# Patient Record
Sex: MALE | Race: WHITE | NOT HISPANIC OR LATINO | Employment: OTHER | URBAN - NONMETROPOLITAN AREA
[De-identification: names, ages, dates, MRNs, and addresses within clinical notes are randomized per-mention and may not be internally consistent; named-entity substitution may affect disease eponyms.]

---

## 2020-10-14 ENCOUNTER — OFFICE VISIT CONVERTED (OUTPATIENT)
Dept: FAMILY MEDICINE CLINIC | Age: 60
End: 2020-10-14
Attending: FAMILY MEDICINE

## 2020-10-19 ENCOUNTER — HOSPITAL ENCOUNTER (OUTPATIENT)
Dept: OTHER | Facility: HOSPITAL | Age: 60
Discharge: HOME | End: 2020-10-19
Attending: FAMILY MEDICINE

## 2020-10-19 LAB
ALBUMIN SERPL-MCNC: 4.3 G/DL (ref 3.5–5)
ALBUMIN/GLOB SERPL: 1.6 {RATIO} (ref 1.4–2.6)
ALP SERPL-CCNC: 73 U/L (ref 56–155)
ALT SERPL-CCNC: 25 U/L (ref 10–40)
ANION GAP SERPL CALC-SCNC: 20 MMOL/L (ref 8–19)
AST SERPL-CCNC: 21 U/L (ref 15–50)
BASOPHILS # BLD AUTO: 0.07 10*3/UL (ref 0–0.2)
BASOPHILS NFR BLD AUTO: 1.2 % (ref 0–3)
BILIRUB SERPL-MCNC: 0.46 MG/DL (ref 0.2–1.3)
BUN SERPL-MCNC: 15 MG/DL (ref 5–25)
BUN/CREAT SERPL: 16 {RATIO} (ref 6–20)
CALCIUM SERPL-MCNC: 9.4 MG/DL (ref 8.7–10.4)
CHLORIDE SERPL-SCNC: 103 MMOL/L (ref 99–111)
CHOLEST SERPL-MCNC: 192 MG/DL (ref 107–200)
CHOLEST/HDLC SERPL: 4.9 {RATIO} (ref 3–6)
CONV ABS IMM GRAN: 0.04 10*3/UL (ref 0–0.2)
CONV CO2: 22 MMOL/L (ref 22–32)
CONV IMMATURE GRAN: 0.7 % (ref 0–1.8)
CONV TOTAL PROTEIN: 7 G/DL (ref 6.3–8.2)
CREAT UR-MCNC: 0.93 MG/DL (ref 0.7–1.2)
DEPRECATED RDW RBC AUTO: 43.2 FL (ref 35.1–43.9)
EOSINOPHIL # BLD AUTO: 0.14 10*3/UL (ref 0–0.7)
EOSINOPHIL # BLD AUTO: 2.3 % (ref 0–7)
ERYTHROCYTE [DISTWIDTH] IN BLOOD BY AUTOMATED COUNT: 12.4 % (ref 11.6–14.4)
GFR SERPLBLD BASED ON 1.73 SQ M-ARVRAT: >60 ML/MIN/{1.73_M2}
GLOBULIN UR ELPH-MCNC: 2.7 G/DL (ref 2–3.5)
GLUCOSE SERPL-MCNC: 131 MG/DL (ref 70–99)
HCT VFR BLD AUTO: 48 % (ref 42–52)
HDLC SERPL-MCNC: 39 MG/DL (ref 40–60)
HGB BLD-MCNC: 15.7 G/DL (ref 14–18)
LDLC SERPL CALC-MCNC: 113 MG/DL (ref 70–100)
LYMPHOCYTES # BLD AUTO: 2.02 10*3/UL (ref 1–5)
LYMPHOCYTES NFR BLD AUTO: 33.2 % (ref 20–45)
MCH RBC QN AUTO: 30.8 PG (ref 27–31)
MCHC RBC AUTO-ENTMCNC: 32.7 G/DL (ref 33–37)
MCV RBC AUTO: 94.3 FL (ref 80–96)
MONOCYTES # BLD AUTO: 0.61 10*3/UL (ref 0.2–1.2)
MONOCYTES NFR BLD AUTO: 10 % (ref 3–10)
NEUTROPHILS # BLD AUTO: 3.2 10*3/UL (ref 2–8)
NEUTROPHILS NFR BLD AUTO: 52.6 % (ref 30–85)
NRBC CBCN: 0 % (ref 0–0.7)
OSMOLALITY SERPL CALC.SUM OF ELEC: 293 MOSM/KG (ref 273–304)
PLATELET # BLD AUTO: 296 10*3/UL (ref 130–400)
PMV BLD AUTO: 9.7 FL (ref 9.4–12.4)
POTASSIUM SERPL-SCNC: 4.5 MMOL/L (ref 3.5–5.3)
PSA SERPL-MCNC: 1.3 NG/ML (ref 0–4)
RBC # BLD AUTO: 5.09 10*6/UL (ref 4.7–6.1)
SODIUM SERPL-SCNC: 140 MMOL/L (ref 135–147)
TRIGL SERPL-MCNC: 201 MG/DL (ref 40–150)
TSH SERPL-ACNC: 1.64 M[IU]/L (ref 0.27–4.2)
VLDLC SERPL-MCNC: 40 MG/DL (ref 5–37)
WBC # BLD AUTO: 6.08 10*3/UL (ref 4.8–10.8)

## 2020-10-22 LAB
EST. AVERAGE GLUCOSE BLD GHB EST-MCNC: 126 MG/DL
HBA1C MFR BLD: 6 % (ref 3.5–5.7)

## 2020-12-16 ENCOUNTER — OFFICE VISIT CONVERTED (OUTPATIENT)
Dept: FAMILY MEDICINE CLINIC | Age: 60
End: 2020-12-16
Attending: NURSE PRACTITIONER

## 2020-12-22 ENCOUNTER — HOSPITAL ENCOUNTER (OUTPATIENT)
Dept: OTHER | Facility: HOSPITAL | Age: 60
Discharge: HOME | End: 2020-12-22
Attending: NURSE PRACTITIONER

## 2020-12-22 LAB
ALBUMIN SERPL-MCNC: 2.8 G/DL (ref 3.5–5)
ALBUMIN/GLOB SERPL: 1 {RATIO} (ref 1.4–2.6)
ALP SERPL-CCNC: 98 U/L (ref 56–155)
ALT SERPL-CCNC: 105 U/L (ref 10–40)
ANION GAP SERPL CALC-SCNC: 13 MMOL/L (ref 8–19)
AST SERPL-CCNC: 21 U/L (ref 15–50)
BASOPHILS # BLD MANUAL: 0.04 10*3/UL (ref 0–0.2)
BASOPHILS NFR BLD MANUAL: 0.4 % (ref 0–3)
BILIRUB SERPL-MCNC: 0.6 MG/DL (ref 0.2–1.3)
BUN SERPL-MCNC: 13 MG/DL (ref 5–25)
BUN/CREAT SERPL: 13 {RATIO} (ref 6–20)
CALCIUM SERPL-MCNC: 9.6 MG/DL (ref 8.7–10.4)
CHLORIDE SERPL-SCNC: 102 MMOL/L (ref 99–111)
CONV CO2: 25 MMOL/L (ref 22–32)
CONV TOTAL PROTEIN: 5.5 G/DL (ref 6.3–8.2)
CREAT UR-MCNC: 0.98 MG/DL (ref 0.7–1.2)
DEPRECATED RDW RBC AUTO: 44.2 FL
EOSINOPHIL # BLD MANUAL: 0.08 10*3/UL (ref 0–0.7)
EOSINOPHIL NFR BLD MANUAL: 0.8 % (ref 0–7)
ERYTHROCYTE [DISTWIDTH] IN BLOOD BY AUTOMATED COUNT: 13 % (ref 11.5–14.5)
GFR SERPLBLD BASED ON 1.73 SQ M-ARVRAT: >60 ML/MIN/{1.73_M2}
GLOBULIN UR ELPH-MCNC: 2.7 G/DL (ref 2–3.5)
GLUCOSE SERPL-MCNC: 178 MG/DL (ref 70–99)
GRANS (ABSOLUTE): 7.6 10*3/UL (ref 2–8)
GRANS: 74.2 % (ref 30–85)
HBA1C MFR BLD: 14.2 G/DL (ref 14–18)
HCT VFR BLD AUTO: 42.8 % (ref 42–52)
IMM GRANULOCYTES # BLD: 0.2 10*3/UL (ref 0–0.54)
IMM GRANULOCYTES NFR BLD: 2 % (ref 0–0.43)
LYMPHOCYTES # BLD MANUAL: 1.44 10*3/UL (ref 1–5)
LYMPHOCYTES NFR BLD MANUAL: 8.5 % (ref 3–10)
MCH RBC QN AUTO: 30.9 PG (ref 27–31)
MCHC RBC AUTO-ENTMCNC: 33.2 G/DL (ref 33–37)
MCV RBC AUTO: 93 FL (ref 80–96)
MONOCYTES # BLD AUTO: 0.87 10*3/UL (ref 0.2–1.2)
OSMOLALITY SERPL CALC.SUM OF ELEC: 287 MOSM/KG (ref 273–304)
PLATELET # BLD AUTO: 197 10*3/UL (ref 130–400)
PMV BLD AUTO: 9.2 FL (ref 7.4–10.4)
POTASSIUM SERPL-SCNC: 4.3 MMOL/L (ref 3.5–5.3)
RBC # BLD AUTO: 4.6 10*6/UL (ref 4.7–6.1)
SODIUM SERPL-SCNC: 136 MMOL/L (ref 135–147)
VARIANT LYMPHS NFR BLD MANUAL: 14.1 % (ref 20–45)
WBC # BLD AUTO: 10.23 10*3/UL (ref 4.8–10.8)

## 2020-12-23 LAB
EST. AVERAGE GLUCOSE BLD GHB EST-MCNC: 151 MG/DL
HBA1C MFR BLD: 6.9 % (ref 3.5–5.7)

## 2020-12-30 ENCOUNTER — OFFICE VISIT CONVERTED (OUTPATIENT)
Dept: FAMILY MEDICINE CLINIC | Age: 60
End: 2020-12-30
Attending: NURSE PRACTITIONER

## 2021-01-08 ENCOUNTER — HOSPITAL ENCOUNTER (OUTPATIENT)
Dept: OTHER | Facility: HOSPITAL | Age: 61
Discharge: HOME | End: 2021-01-08
Attending: NURSE PRACTITIONER

## 2021-01-14 ENCOUNTER — HOSPITAL ENCOUNTER (OUTPATIENT)
Dept: DIABETES SERVICES | Facility: HOSPITAL | Age: 61
Setting detail: RECURRING SERIES
Discharge: HOME | End: 2021-04-14
Attending: NURSE PRACTITIONER

## 2021-02-19 ENCOUNTER — HOSPITAL ENCOUNTER (OUTPATIENT)
Dept: OTHER | Facility: HOSPITAL | Age: 61
Discharge: HOME | End: 2021-02-19
Attending: NURSE PRACTITIONER

## 2021-03-02 ENCOUNTER — OFFICE VISIT CONVERTED (OUTPATIENT)
Dept: SURGERY | Facility: CLINIC | Age: 61
End: 2021-03-02
Attending: SURGERY

## 2021-03-19 ENCOUNTER — HOSPITAL ENCOUNTER (OUTPATIENT)
Dept: OTHER | Facility: HOSPITAL | Age: 61
Discharge: HOME | End: 2021-03-19
Attending: SURGERY

## 2021-03-19 LAB — SARS-COV-2 RNA SPEC QL NAA+PROBE: NOT DETECTED

## 2021-03-24 ENCOUNTER — HOSPITAL ENCOUNTER (OUTPATIENT)
Dept: PERIOP | Facility: HOSPITAL | Age: 61
Setting detail: HOSPITAL OUTPATIENT SURGERY
Discharge: HOME | End: 2021-03-24
Attending: SURGERY

## 2021-03-24 LAB
ANION GAP SERPL CALC-SCNC: 14 MMOL/L (ref 8–19)
BUN SERPL-MCNC: 23 MG/DL (ref 5–25)
BUN/CREAT SERPL: 24 {RATIO} (ref 6–20)
CALCIUM SERPL-MCNC: 9.3 MG/DL (ref 8.7–10.4)
CHLORIDE SERPL-SCNC: 103 MMOL/L (ref 99–111)
CONV CO2: 25 MMOL/L (ref 22–32)
CREAT UR-MCNC: 0.97 MG/DL (ref 0.7–1.2)
GFR SERPLBLD BASED ON 1.73 SQ M-ARVRAT: >60 ML/MIN/{1.73_M2}
GLUCOSE BLD-MCNC: 138 MG/DL (ref 70–99)
GLUCOSE SERPL-MCNC: 112 MG/DL (ref 70–99)
OSMOLALITY SERPL CALC.SUM OF ELEC: 290 MOSM/KG (ref 273–304)
POTASSIUM SERPL-SCNC: 4.1 MMOL/L (ref 3.5–5.3)
SODIUM SERPL-SCNC: 138 MMOL/L (ref 135–147)

## 2021-04-06 ENCOUNTER — OFFICE VISIT CONVERTED (OUTPATIENT)
Dept: FAMILY MEDICINE CLINIC | Age: 61
End: 2021-04-06
Attending: NURSE PRACTITIONER

## 2021-04-06 ENCOUNTER — OFFICE VISIT CONVERTED (OUTPATIENT)
Dept: SURGERY | Facility: CLINIC | Age: 61
End: 2021-04-06
Attending: SURGERY

## 2021-04-09 ENCOUNTER — HOSPITAL ENCOUNTER (OUTPATIENT)
Dept: OTHER | Facility: HOSPITAL | Age: 61
Discharge: HOME | End: 2021-04-09
Attending: NURSE PRACTITIONER

## 2021-04-09 LAB
ALBUMIN SERPL-MCNC: 4 G/DL (ref 3.5–5)
ALBUMIN/GLOB SERPL: 1.5 {RATIO} (ref 1.4–2.6)
ALP SERPL-CCNC: 63 U/L (ref 56–155)
ALT SERPL-CCNC: 18 U/L (ref 10–40)
ANION GAP SERPL CALC-SCNC: 16 MMOL/L (ref 8–19)
AST SERPL-CCNC: 18 U/L (ref 15–50)
BILIRUB SERPL-MCNC: 0.34 MG/DL (ref 0.2–1.3)
BUN SERPL-MCNC: 22 MG/DL (ref 5–25)
BUN/CREAT SERPL: 21 {RATIO} (ref 6–20)
CALCIUM SERPL-MCNC: 9.6 MG/DL (ref 8.7–10.4)
CHLORIDE SERPL-SCNC: 103 MMOL/L (ref 99–111)
CHOLEST SERPL-MCNC: 144 MG/DL (ref 107–200)
CHOLEST/HDLC SERPL: 3.6 {RATIO} (ref 3–6)
CONV CO2: 25 MMOL/L (ref 22–32)
CONV TOTAL PROTEIN: 6.7 G/DL (ref 6.3–8.2)
CREAT UR-MCNC: 1.07 MG/DL (ref 0.7–1.2)
ERYTHROCYTE [DISTWIDTH] IN BLOOD BY AUTOMATED COUNT: 12.8 % (ref 11.5–14.5)
EST. AVERAGE GLUCOSE BLD GHB EST-MCNC: 108 MG/DL
GFR SERPLBLD BASED ON 1.73 SQ M-ARVRAT: >60 ML/MIN/{1.73_M2}
GLOBULIN UR ELPH-MCNC: 2.7 G/DL (ref 2–3.5)
GLUCOSE SERPL-MCNC: 106 MG/DL (ref 70–99)
HBA1C MFR BLD: 13 G/DL (ref 14–18)
HBA1C MFR BLD: 5.4 % (ref 3.5–5.7)
HCT VFR BLD AUTO: 39.5 % (ref 42–52)
HDLC SERPL-MCNC: 40 MG/DL (ref 40–60)
LDLC SERPL CALC-MCNC: 76 MG/DL (ref 70–100)
MCH RBC QN AUTO: 31.9 PG (ref 27–31)
MCHC RBC AUTO-ENTMCNC: 32.9 G/DL (ref 33–37)
MCV RBC AUTO: 96.8 FL (ref 80–96)
OSMOLALITY SERPL CALC.SUM OF ELEC: 292 MOSM/KG (ref 273–304)
PLATELET # BLD AUTO: 341 10*3/UL (ref 130–400)
PMV BLD AUTO: 9.8 FL (ref 7.4–10.4)
POTASSIUM SERPL-SCNC: 4.7 MMOL/L (ref 3.5–5.3)
RBC # BLD AUTO: 4.08 10*6/UL (ref 4.7–6.1)
SODIUM SERPL-SCNC: 139 MMOL/L (ref 135–147)
TRIGL SERPL-MCNC: 142 MG/DL (ref 40–150)
TSH SERPL-ACNC: 1.38 M[IU]/L (ref 0.27–4.2)
VIT B12 SERPL-MCNC: 860 PG/ML (ref 211–911)
VLDLC SERPL-MCNC: 28 MG/DL (ref 5–37)
WBC # BLD AUTO: 6.18 10*3/UL (ref 4.8–10.8)

## 2021-04-13 LAB
SHBG SERPL-SCNC: 26.4 NMOL/L (ref 19.3–76.4)
TESTOST SERPL-MCNC: 309 NG/DL (ref 264–916)
TESTOSTERONE, FREE: 10.1 PG/ML (ref 6.6–18.1)

## 2021-04-17 ENCOUNTER — HOSPITAL ENCOUNTER (OUTPATIENT)
Dept: OTHER | Facility: HOSPITAL | Age: 61
Discharge: HOME | End: 2021-04-17
Attending: NURSE PRACTITIONER

## 2021-05-10 NOTE — H&P
History and Physical      Patient Name: Meek Quiros   Patient ID: [id number]   Sex: Male   YOB: 1960    Primary Care Provider: Gayle HERNANDEZ   Referring Provider: Gayle HERNANDEZ    Visit Date: March 2, 2021    Provider: Shahid Guerra MD   Location: [facility] [place]   Location Address: [address] [place] [address]  [address] [place] [address]  [id number]   Location Phone: [phone]          Chief Complaint  · Outpatient History & Physical / Surgical Orders  · Hernia Consult      History Of Present Illness     Mr. Quiros came in today for evaluation. He is a very nice gentleman who has a symptomatic right inguinal hernia. He has noticed it for just a little while now. It is a little bit more uncomfortable over the last week or so. He had an open left inguinal hernia repair about 15 years ago and has done well from that.       Past Medical History  Diabetes education, encounter for; HTN (hypertension); Hyperlipidemia         Past Surgical History  Hernia-Inguinal; Knee surgery         Medication List  atorvastatin 20 mg oral tablet; hydrochlorothiazide 12.5 mg oral tablet; lisinopril 40 mg oral tablet; metformin 500 mg oral tablet         Allergy List  NO KNOWN DRUG ALLERGIES       Allergies Reconciled  Family Medical History  Family history of diabetes mellitus         Social History  Tobacco (Former)         Review of Systems  · Constitutional  o Denies  o : fever  · Eyes  o Denies  o : yellowish discoloration of eyes  · HENT  o Denies  o : difficulty swallowing  · Cardiovascular  o Denies  o : chest pain on exertion  · Respiratory  o Admits  o : shortness of breath  · Gastrointestinal  o Denies  o : nausea, vomiting, diarrhea, constipation  · Integument  o Denies  o : rash  · Neurologic  o Denies  o : tingling or numbness  · Musculoskeletal  o Denies  o : joint pain  · Endocrine  o Admits  o : weight loss  o Denies  o : weight gain      Vitals  Date Time BP  "Position Site L\R Cuff Size HR RR TEMP (F) WT  HT  BMI kg/m2 BSA m2 O2 Sat FR L/min FiO2 HC       03/02/2021 10:45 AM       16 97 206lbs 8oz 6'  2\" 26.51 2.21             Physical Examination  · Constitutional  o Appearance  o : well-nourished, well developed, alert, in no acute distress  · Head and Face  o Head  o :   § Inspection  § : atraumatic, normocephalic  · Neck  o Inspection/Palpation  o : supple, normal range of motion  · Respiratory  o Inspection of Chest  o : normal inspection  o Auscultation of Lungs  o : breath sounds normal, no distress, clear to ascultate bilaterally  · Cardiovascular  o Heart  o :   § Auscultation of Heart  § : regular rate and rhythm, no murmur, gallop or rub  · Gastrointestinal  o Abdominal Examination  o : normal bowel sounds, non-tender, soft  · Groin  o Groin  o : RIH noted              Assessment  · Pre-Surgical Orders     V72.84  · Non-recurrent unilateral inguinal hernia without obstruction or gangrene     550.90/K40.90       Very nice gentleman who was referred for a symptomatic right inguinal hernia.       Plan  · Orders  o MG Pre-Op Covid-19 Screening (97864) - 550.90/K40.90 - 03/19/2021  o General Surgery Order (GENOR) - 550.90/K40.90 - 03/24/2021  · Medications  o Medications have been Reconciled  o Transition of Care or Provider Policy  · Instructions  o PLAN:  o Handouts Provided-Pre-Procedure Instructions including date and time and location of procedure.  o Surgical Facility: [facility]  o ****Surgical Orders****  o ****Patient Status****  o Outpatient  o RISK AND BENEFITS:  o Consent for surgery: Given these options, the patient has verbally expressed an understanding of the risks of surgery and finds these risks acceptable. We will proceed with surgery as soon as possible.  o Consult Anesthesia for any post-operative block, or any pain management procedure deemed necessary by the anestesiologist for adequate post-operative pain control.   o O.R. PREP: " Per protocol  o SCD's preoperatively  o PLEASE SIGN PERMIT FOR: Robotic right inguinal hernia repair  o *__Kefzol 2 gram IV on call to OR.  o *___The above History and Physical Examination has been completed within 30 days of admission.  o Electronically Identified Patient Education Materials Provided Electronically     We will set him up for a robotic right inguinal hernia repair. I have described the procedure to him as well as the risks and benefits and he is agreeable to proceeding.             Electronically Signed by: Nohelia Rincon-, -Author on March 3, 2021 10:49:37 AM  Electronically Co-signed by: Shahid Guerra MD -Reviewer on March 5, 2021 09:42:54 AM

## 2021-05-14 VITALS — TEMPERATURE: 97.4 F | HEIGHT: 74 IN | RESPIRATION RATE: 16 BRPM | WEIGHT: 204.37 LBS | BODY MASS INDEX: 26.23 KG/M2

## 2021-05-14 VITALS — TEMPERATURE: 97 F | RESPIRATION RATE: 16 BRPM | WEIGHT: 206.5 LBS | HEIGHT: 74 IN | BODY MASS INDEX: 26.5 KG/M2

## 2021-05-14 NOTE — PROGRESS NOTES
"   Progress Note      Patient Name: Meek Quiros   Patient ID: [id number]   Sex: Male   YOB: 1960    Primary Care Provider: Gayle HERNANDEZ   Referring Provider: Gayle HERNANDEZ    Visit Date: April 6, 2021    Provider: Shahid Guerra MD   Location: [facility] [place]   Location Address: [address] [place] [address]  [address] [place] [address]  [id number]   Location Phone: [phone]          Chief Complaint  · Follow Up Office Visit      History Of Present Illness     Venu came back for follow-up. He is doing well following a robotic right inguinal hernia repair. He is a little bit sore at his incisions but otherwise he feels good. He is not having any right inguinal pain.       Past Medical History  Diabetes education, encounter for; HTN (hypertension); Hyperlipidemia         Past Surgical History  Hernia-Inguinal; Knee surgery         Medication List  atorvastatin 20 mg oral tablet; hydrochlorothiazide 12.5 mg oral tablet; lisinopril 40 mg oral tablet; metformin 500 mg oral tablet         Allergy List  NO KNOWN DRUG ALLERGIES         Family Medical History  Family history of diabetes mellitus         Social History  Tobacco (Former)         Review of Systems  · Cardiovascular  o Denies  o : chest pain, irregular heart beats, rapid heart rate, chest pain on exertion, shortness of breath, lower extremity swelling  · Respiratory  o Denies  o : shortness of breath, wheezing, cough, wheezing, chronic cough, coughing up blood  · Gastrointestinal  o Denies  o : nausea, vomiting, diarrhea, chronic abdominal pain, reflux symptoms      Vitals  Date Time BP Position Site L\R Cuff Size HR RR TEMP (F) WT  HT  BMI kg/m2 BSA m2 O2 Sat FR L/min FiO2 HC       04/06/2021 09:47 AM       16 97.4 204lbs 6oz 6'  2\" 26.24 2.2             Physical Examination     Today on physical exam, the incisions all look good. There is no evidence of infection. The hernia repair feels intact. "           Assessment  · Postoperative Exam Following Surgery     V67.00       Doing well status post robotic right inguinal hernia repair.       Plan  · Medications  o Medications have been Reconciled  o Transition of Care or Provider Policy  · Instructions  o Follow up as needed.            Electronically Signed by: Nohelia Rincon-, -Author on April 6, 2021 03:10:25 PM  Electronically Co-signed by: Shahid Guerra MD -Reviewer on April 7, 2021 09:00:26 AM

## 2021-05-18 NOTE — PROGRESS NOTES
Meek Quiros  1960     Office/Outpatient Visit    Visit Date: Tue, Apr 6, 2021 01:25 pm    Provider: Gayle Terry N.P. (Assistant: Adeline Fine,  )    Location: [facility]        Electronically signed by Gayle Terry N.P. on  04/06/2021 09:28:54 PM                             Subjective:        CC: Mr. Quiros is a 60 year old White male.  This is a follow-up visit.  Continues to have fatigue and discuss dizziness when standing up.          HPI:           Patient to be evaluated for essential (primary) hypertension.  His current cardiac medication regimen includes lisinopril. hctz.  Compliance with treatment has been good; he takes his medication as directed and follows up as directed.  He is tolerating the medication well without side effects.  He did not bring his blood pressure diary, but says that pressures have been well controlled.            In regard to the hyperlipidemia, unspecified, current treatment includes Lipitor and a low cholesterol/low fat diet.  Compliance with treatment has been good; he takes his medication as directed.  He denies experiencing any hypercholesterolemia related symptoms.            With regard to the type 2 diabetes mellitus without complications, compliance with treatment has been good; he takes his medication as directed and maintains his diet and exercise regimen.  Current meds include an oral hypoglycemic ( Glucophage ( 500mg qd ) ).  Most recent lab results include glycohemoglobin 6.7%.            Concerning other fatigue, patient describes problem of mild fatigue for the last 4 months.  This has fluctuated symptoms.  Sleep quality: no problems with initiation or maintenance of sleep.      ROS:     CONSTITUTIONAL:  Positive for fatigue.   Negative for chills or fever.      E/N/T:  Negative for hearing problems, E/N/T pain, congestion, rhinorrhea, epistaxis, hoarseness, and dental problems.      CARDIOVASCULAR:  Negative for chest pain,  palpitations, tachycardia, orthopnea, and edema.      RESPIRATORY:  Positive for dyspnea ( with heavy exertion ).   Negative for recent cough or frequent wheezing.      GASTROINTESTINAL:  Negative for abdominal pain, heartburn, constipation, diarrhea, and stool changes.      MUSCULOSKELETAL:  Negative for arthralgias, back pain, and myalgias.      NEUROLOGICAL:  Negative for dizziness, headaches, paresthesias, and weakness.      ALLERGIC/IMMUNOLOGIC:  Positive for seasonal allergies.      PSYCHIATRIC:  Negative for anxiety, depression, and sleep disturbances.          Past Medical History / Family History / Social History:         Last Reviewed on 4/06/2021 02:02 PM by Gayle Terry    Past Medical History:             PAST MEDICAL HISTORY         Positive for    Hypertension; Hospitalizations: covid pneumonia dec 2020         PREVENTIVE HEALTH MAINTENANCE             COLORECTAL CANCER SCREENING: Up to date (colonoscopy q10y; sigmoidoscopy q5y; Cologuard q3y) was last done 2013; colonoscopy with normal results     PSA: was last done 10/19/20 with normal results         Surgical History:         Arthroscopy: 2 on each knee;     Hernia Repair: right inguinal;     melanoma excision;         Family History:     Father: Deep Venous Thrombosis ( s/p knee replacement );  Skin Cancer ( melanoma )     Mother:;  valvular disease         Social History:     Occupation: Retired (Prior occupation: [employer] )     Marital Status:      Children: 5 children     Hobbies/Recreation: he enjoys sports ( golf );     Mr. Quiros denies any current form of exercise.          Tobacco/Alcohol/Supplements:     Last Reviewed on 4/06/2021 01:29 PM by Adeline Fine    Tobacco: He has a past history of cigarette smoking; quit date:  1986.          Alcohol: Frequency:    less than once a month; When he drinks, the average quantity of alcohol is 1 drinks.   He typically consumes beer.      Caffeine:  He admits to  consuming caffeine via coffee ( 2 servings per day ).          Substance Abuse History:     Last Reviewed on 10/14/2020 11:37 AM by Gustabo Skinner    None         Mental Health History:     Last Reviewed on 10/14/2020 11:37 AM by Gustabo Skinner    NEGATIVE         Communicable Diseases (eg STDs):     Last Reviewed on 10/14/2020 11:37 AM by Gustabo Skinner        Current Problems:     Last Reviewed on 4/06/2021 09:25 PM by Gayle Terry    Essential (primary) hypertension    Encounter for screening for malignant neoplasm of prostate    Hyperlipidemia, unspecified    Type 2 diabetes mellitus without complications    Encounter for screening for other viral diseases    Impacted cerumen, right ear    Other fatigue        Immunizations:     None        Allergies:     Last Reviewed on 4/06/2021 01:29 PM by Adeline Fine    No Known Allergies.        Current Medications:     Last Reviewed on 4/06/2021 01:29 PM by Adeline Fine    lisinopriL 40 mg oral tablet [take 1 tablet (40 mg) by oral route once daily]    atorvastatin 10 mg oral tablet [take 1 tablet (10 mg) by oral route once daily]    metFORMIN 500 mg oral Tablet, Extended Release Gastric Retention 24 hr [take 1 tablet (500 mg) by oral route once daily with the evening meal]    hydroCHLOROthiazide 12.5 mg oral capsule [take 1 capsule (12.5 mg) by oral route once daily]        Objective:        Vitals:         Current: 4/6/2021 1:31:35 PM    Ht:  6 ft, 2 in;  Wt: 205.8 lbs;  BMI: 26.4T: 97.9 F (temporal);  BP: 124/51 mm Hg (left arm, sitting);  P: 53 bpm (left arm (BP Cuff), sitting);  sCr: 0.98 mg/dL;  GFR: 83.56        Exams:     PHYSICAL EXAM:     GENERAL:  well developed and nourished; appropriately groomed; in no apparent distress;     E/N/T: EARS: external auditory canal occluded by cerumen on the right;  left TM is normal;  OROPHARYNX: posterior pharynx, including tonsils, tongue, and uvula are normal;     NECK: thyroid is non-palpable;      RESPIRATORY: normal respiratory rate and pattern with no distress; normal breath sounds with no rales, rhonchi, wheezes or rubs;     CARDIOVASCULAR: normal rate; rhythm is regular;  no edema;     MUSCULOSKELETAL:  Normal range of motion, strength and tone;     NEUROLOGIC: mental status: alert and oriented x 3; GROSSLY INTACT     PSYCHIATRIC:  appropriate affect and demeanor; normal speech pattern; grossly normal memory;         Procedures:     Impacted cerumen, right ear        Cerumen/Wax removal: Cerumen impaction is noted in the right ear The degree of wax accumulation is minor in the right ear.  With minimal difficulty, using a syringe irrigation, the wax is removed.  Removed from ear was hard balls of wax.      There were no complications.  kdm             Assessment:         I10   Essential (primary) hypertension       E78.5   Hyperlipidemia, unspecified       E11.9   Type 2 diabetes mellitus without complications       R53.83   Other fatigue       H61.21   Impacted cerumen, right ear           ORDERS:         Meds Prescribed:       [Refilled] lisinopriL 40 mg oral tablet [take 1 tablet (40 mg) by oral route once daily], #90 (ninety) tablets, Refills: 1 (one)       [Refilled] hydroCHLOROthiazide 12.5 mg oral capsule [take 1 capsule (12.5 mg) by oral route once daily], #90 (ninety) capsules, Refills: 1 (one)       [Refilled] atorvastatin 10 mg oral tablet [take 1 tablet (10 mg) by oral route once daily], #90 (ninety) tablets, Refills: 1 (one)       [Refilled] metFORMIN 500 mg oral Tablet, Extended Release Gastric Retention 24 hr [take 1 tablet (500 mg) by oral route once daily with the evening meal], #90 (ninety) tablets, Refills: 1 (one)         Radiology/Test Orders:       [id number]  Colorectal CA screen results documented and reviewed (PV)  (In-House)              Lab Orders:       [id number]  DIAB1 - [facility] LIPID,CMP, A1C: 48342, 14604, 72636  (Send-Out)            38030  VB12 - H Vitamin B12  (Send-Out)             67191  BDCB2 - [facility] CBC w/o diff  (Send-Out)            11475  TFTSG - [facility] Testosterone, free and Total weakly bound  (Send-Out)            66749  TSH - [facility] TSH  (Send-Out)              Procedures Ordered:       [id number]  Removal of impacted cerumen right ear (NURSE)  (In-House)              Other Orders:       DLEYE  Dilated Eye Exam  (Send-Out)                      Plan:         Essential (primary) hypertension        RECOMMENDATIONS given include: perform routine monitoring of blood pressure with home blood pressure cuff, exercise, reduction of dietary salt intake, and take medication as prescribed, try not to miss doses.  MIPS Vaccines Flu and Pneumonia updated in Shot record Colorectal Cancer Screening is up to date and the results are in the chart           Prescriptions:       [Refilled] lisinopriL 40 mg oral tablet [take 1 tablet (40 mg) by oral route once daily], #90 (ninety) tablets, Refills: 1 (one)       [Refilled] hydroCHLOROthiazide 12.5 mg oral capsule [take 1 capsule (12.5 mg) by oral route once daily], #90 (ninety) capsules, Refills: 1 (one)           Orders:       [id number]  Colorectal CA screen results documented and reviewed (PV)  (In-House)              Hyperlipidemia, unspecified        RECOMMENDATIONS given include: low cholesterol/low fat diet.            Prescriptions:       [Refilled] atorvastatin 10 mg oral tablet [take 1 tablet (10 mg) by oral route once daily], #90 (ninety) tablets, Refills: 1 (one)         Type 2 diabetes mellitus without complications    LABORATORY:  Labs ordered to be performed today include Diabetes Panel 1; CMP, Lipid, A1C.      RECOMMENDATIONS: adherance to Low carb, NCS diet diet and annual eye exams.      COUNSELING: The patient was counseled concerning the relationship between diabetes control and macrovascular disease including cardiovascular, cerebrovascular and peripheral vascular disease. The patient was counseled concerning the relationship between diabetes  control and retinopathy, nephropathy, and neuropathy. The A1c target of <7% according to ADA and <6.5% according to AACE were discussed.            Prescriptions:       [Refilled] metFORMIN 500 mg oral Tablet, Extended Release Gastric Retention 24 hr [take 1 tablet (500 mg) by oral route once daily with the evening meal], #90 (ninety) tablets, Refills: 1 (one)           Orders:       [id number]  DIAB1 - [facility] LIPID,CMP, A1C: 96063, 70451, 33206  (Send-Out)            DLEYE  Dilated Eye Exam  (Send-Out)              Other fatigue    LABORATORY:  Labs ordered to be performed today include B12, CBC W/O DIFF, Testosterone free and total, and TSH.            Orders:       84317  VB12 - H Vitamin B12  (Send-Out)            88185  BDCB2 - H CBC w/o diff  (Send-Out)            98935  TFTSG - [facility] Testosterone, free and Total weakly bound  (Send-Out)            93457  TSH - [facility] TSH  (Send-Out)              Impacted cerumen, right ear        RECOMMENDATIONS given include: mild fluid behind right TM after cerumen removal.  start flonase nasal spray daily x 2 wks and claritin , zyrtec, or allegra prn allergy symtoms.  follow up if not imrpoving..            Orders:       [id number]  Removal of impacted cerumen right ear (NURSE)  (In-House)                  Patient Recommendations:        For  Essential (primary) hypertension:    Begin monitoring your blood pressure by brief nurse visits at our office, a home blood pressure monitor, or by checking on the machines in pharmacies or stores.  Keep a log of the readings. Maintain a regular exercise program. Reduce the amount of salt in your diet.          For  Hyperlipidemia, unspecified:    Reduce the amount of cholesterol and saturated fat in your diet.          For  Type 2 diabetes mellitus without complications:    Follow a diabetic diet as directed. Have an annual eye exam.              Charge Capture:         Primary Diagnosis:     I10  Essential (primary) hypertension            Orders:      95083  Office/outpatient visit; established patient, level 4  (In-House)            3017F  Colorectal CA screen results documented and reviewed (PV)  (In-House)              E78.5  Hyperlipidemia, unspecified     E11.9  Type 2 diabetes mellitus without complications     R53.83  Other fatigue     H61.21  Impacted cerumen, right ear           Orders:      [id number]  Removal of impacted cerumen right ear (NURSE)  (In-House)

## 2021-05-18 NOTE — PROGRESS NOTES
Meek Quiros  1960     Office/Outpatient Visit    Visit Date: Wed, Dec 16, 2020 01:51 pm    Provider: Gayle Terry N.P. (Assistant: Kami Guerra MA)    Location: [facility]        Electronically signed by Gayle Terry N.P. on  12/20/2020 09:28:43 PM                             Subjective:        CC: Mr. Quiros is a 60 year old male.  He is here today following a transition of care from an inpatient hospital: [facility]. The patient was admitted on 12/07/2020. The patient was admitted for COVID. Our office called the patient within 48 hours of discharge and scheduled the follow-up appointment. During the patient's hospital stay the patient was treated by Dr. Olivier. Medications have been reviewed and reconciled with discharge summary..  (VIDEO call )         HPI:           pt had been short of breath x 1 week when he went to [facility] ER.  dx + covid 19 and lung ct confirmed pneumonia.  noted hypoxia, elevated liver enzymes, HTN (established on lisinopril and dose was increased to 40 mg here oct 2020) and acute renal failure.  rec'd convaslescent plasma x 1, rendesivir, decadon (discharged on 6 day course that he will complete in 2 days) NS ovenox.  oxygen at 2 liters (rec'd from [facility]).  pulse ox mid 90s at rest  and mid to upper 80s with ambulation and activity as tolerated.     then 91 at discharge.   and then  203 at discharge.  he is feeling better.  decadron does make him mildly irritable.  wbc 15.1 at discharge.      ROS:     CONSTITUTIONAL:  Positive for fatigue.   Negative for chills or fever.      CARDIOVASCULAR:  Negative for chest pain, palpitations, tachycardia, orthopnea, and edema.      RESPIRATORY:  Positive for recent cough and dyspnea ( with moderate exertion ).   Negative for frequent wheezing.      GASTROINTESTINAL:  Positive for acid reflux symptoms.   Negative for abdominal pain, diarrhea, heartburn, nausea or vomiting.       MUSCULOSKELETAL:  Negative for arthralgias, back pain, and myalgias.      NEUROLOGICAL:  Negative for dizziness, headaches, paresthesias, and weakness.      PSYCHIATRIC:  Negative for anxiety, depression, and sleep disturbances.          Past Medical History / Family History / Social History:         Last Reviewed on 12/16/2020 02:43 PM by Gayle Terry    Past Medical History:             PAST MEDICAL HISTORY         Positive for    Hypertension; Hospitalizations: covid pneumonia dec 2020         PREVENTIVE HEALTH MAINTENANCE             COLORECTAL CANCER SCREENING: Up to date (colonoscopy q10y; sigmoidoscopy q5y; Cologuard q3y) was last done 2013; colonoscopy with normal results     PSA: was last done 10/19/20 with normal results         Surgical History:         Arthroscopy: 2 on each knee;     Hernia Repair: right inguinal;     melanoma excision;         Family History:     Father: Deep Venous Thrombosis ( s/p knee replacement );  Skin Cancer ( melanoma )     Mother:;  valvular disease         Social History:     Occupation: Retired (Prior occupation: [employer] )     Marital Status:      Children: 5 children     Hobbies/Recreation: he enjoys sports ( golf );     Mr. Quiros denies any current form of exercise.          Tobacco/Alcohol/Supplements:     Last Reviewed on 12/16/2020 01:52 PM by Kami Guerra    Tobacco: He has a past history of cigarette smoking; quit date:  1986.          Alcohol: Frequency:    less than once a month; When he drinks, the average quantity of alcohol is 1 drinks.   He typically consumes beer.      Caffeine:  He admits to consuming caffeine via coffee ( 2 servings per day ).          Substance Abuse History:     Last Reviewed on 10/14/2020 11:37 AM by Gustabo Skinner    None         Mental Health History:     Last Reviewed on 10/14/2020 11:37 AM by Gustabo Skinner    NEGATIVE         Communicable Diseases (eg STDs):     Last Reviewed on 10/14/2020 11:37  AM by Gustabo Skinner        Current Problems:     Last Reviewed on 10/14/2020 11:37 AM by Gustabo Skinner    Essential (primary) hypertension    Encounter for screening for malignant neoplasm of prostate    Hyperlipidemia, unspecified    Encounter for follow-up examination after completed treatment for conditions other than malignant neoplasm        Immunizations:     None        Current Medications:     Last Reviewed on 12/16/2020 01:53 PM by Kami Guerra    lisinopriL 40 mg oral tablet [take 1 tablet (40 mg) by oral route once daily]    atorvastatin 10 mg oral tablet [take 1 tablet (10 mg) by oral route once daily]        Objective:        Exams:     PHYSICAL EXAM:     GENERAL: no apparent distress;     NEUROLOGIC: mental status: alert and oriented x 3; GROSSLY INTACT     PSYCHIATRIC:  appropriate affect and demeanor; normal speech pattern; grossly normal memory;         Assessment:         U07.1   COVID-19       R74.01   Elevation of levels of liver transaminase levels       D72.829   Elevated white blood cell count, unspecified           ORDERS:         Lab Orders:       FUTURE  Future order to be done at patients convenience  (Send-Out)            35539  [facility] Comp. Metabolic Panel  (Send-Out)            FUTURE  Future order to be done at patients convenience  (Send-Out)            [id number]  [facility] CBC with 3 part diff  (Send-Out)                      Plan:         COVID-19        RECOMMENDATIONS given include: he is feeling much better.  denies concerns.  recheck labs as below..  Telehealth: Verbal consent obtained for visit to occur via televideo conferencing         Elevation of levels of liver transaminase levels        FOLLOW-UP TESTING #1: FOLLOW-UP LABORATORY:  Labs to be scheduled in the future include CMP.            Orders:       FUTURE  Future order to be done at patients convenience  (Send-Out)            [id number]  [facility] Comp. Metabolic Panel  (Send-Out)              Elevated white  blood cell count, unspecified        FOLLOW-UP TESTING #1: FOLLOW-UP LABORATORY:  Labs to be scheduled in the future include CBC.            Orders:       FUTURE  Future order to be done at patients convenience  (Send-Out)            32763  [facility] CBC with 3 part diff  (Send-Out)                  Patient Recommendations:        For  Elevation of levels of liver transaminase levels:            The following laboratory testing has been ordered: metabolic panel, comprehensive         For  Elevated white blood cell count, unspecified:            The following laboratory testing has been ordered: CBC             Charge Capture:         Primary Diagnosis:     U07.1  COVID-19           Orders:      [id number]  Office/outpatient visit; established patient, level 3  (In-House)              R74.01  Elevation of levels of liver transaminase levels     D72.829  Elevated white blood cell count, unspecified

## 2021-05-18 NOTE — PROGRESS NOTES
Meek Quiros  1960     Office/Outpatient Visit    Visit Date: Wed, Oct 14, 2020 11:05 am    Provider: Gustabo Skinner MD (Assistant: Mariella Simon MA)    Location: [facility]        Electronically signed by Gustabo Skinner MD on  10/14/2020 06:11:16 PM                             Subjective:        CC: Mr. Quiros is a 60 year old male.  This is his first visit to the clinic.  Establish care, physical         HPI: Pt moved here 3.5 years ago. He was seeing Dr. Albarran but he didn't accept his wife as well. He was living in [place], [place] south of [place]. He is originally from [place].       BP today is 168/67 with a HR of 50. He had HTN years ago and he is on lisinopril 20 mg qd. He checks BP about every couple weeks and its been trending up recently.    ROS:     CONSTITUTIONAL:  Negative for fatigue and fever.      EYES:  Negative for blurred vision.      E/N/T:  Negative for diminished hearing and nasal congestion.      CARDIOVASCULAR:  Negative for chest pain and palpitations.      RESPIRATORY:  Negative for recent cough and dyspnea.      GASTROINTESTINAL:  Negative for abdominal pain, constipation, diarrhea, nausea and vomiting.      GENITOURINARY:  Negative for dysuria.      MUSCULOSKELETAL:  Negative for arthralgias and myalgias.      INTEGUMENTARY:  Negative for atypical mole(s) and rash.      NEUROLOGICAL:  Negative for paresthesias and weakness.      PSYCHIATRIC:  Negative for anxiety, depression and sleep disturbance.          Past Medical History / Family History / Social History:         Last Reviewed on 10/14/2020 11:37 AM by Gustabo Skinner    Past Medical History:             PAST MEDICAL HISTORY         Positive for    Hypertension;         PREVENTIVE HEALTH MAINTENANCE             COLORECTAL CANCER SCREENING: Up to date (colonoscopy q10y; sigmoidoscopy q5y; Cologuard q3y) was last done 2013; colonoscopy with normal results         Surgical History:         Arthroscopy: 2  on each knee;     Hernia Repair: right inguinal;         Family History:     Father: Deep Venous Thrombosis ( s/p knee replacement );  Skin Cancer ( melanoma )     Mother:;  valvular disease         Social History:     Occupation: Retired (Prior occupation: [employer] )     Marital Status:      Children: 5 children     Hobbies/Recreation: he enjoys sports ( golf );     Mr. Quiros denies any current form of exercise.          Tobacco/Alcohol/Supplements:     Last Reviewed on 10/14/2020 11:37 AM by Gustabo Skinner    Tobacco: He has a past history of cigarette smoking; quit date:  1986.          Alcohol: Frequency:    less than once a month; When he drinks, the average quantity of alcohol is 1 drinks.   He typically consumes beer.      Caffeine:  He admits to consuming caffeine via coffee ( 2 servings per day ).          Substance Abuse History:     Last Reviewed on 10/14/2020 11:37 AM by Gustabo Skinner    None         Mental Health History:     Last Reviewed on 10/14/2020 11:37 AM by Gustabo Skinner    NEGATIVE         Communicable Diseases (eg STDs):     Last Reviewed on 10/14/2020 11:37 AM by Gustabo Skinner        Current Problems:     Last Reviewed on 10/14/2020 11:37 AM by Gustabo Skinner    Essential (primary) hypertension        Immunizations:     None        Current Medications:     Last Reviewed on 10/14/2020 11:37 AM by Gustabo Skinner    None        Objective:        Vitals:         Current: 10/14/2020 11:08:51 AM    Ht:  6 ft, 2 in;  Wt: 226.8 lbs;  BMI: 29.1T: 97.1 F (temporal);  BP: 168/67 mm Hg (left arm, sitting);  P: 50 bpm (left arm (BP Cuff), sitting)        Exams:     PHYSICAL EXAM:     GENERAL: Vitals recorded well developed, well nourished;  well groomed;  no apparent distress;     EYES: extraocular movements intact; conjunctiva and cornea are normal; PERRLA;     E/N/T: OROPHARYNX:  normal mucosa, dentition, gingiva, and posterior pharynx;     NECK: range of  motion is normal; thyroid exam reveals not enlarged;     RESPIRATORY: normal respiratory rate and pattern with no distress; normal breath sounds with no rales, rhonchi, wheezes or rubs;     CARDIOVASCULAR: normal rate; rhythm is regular;  no systolic murmur; no edema;     GASTROINTESTINAL: nontender; normal bowel sounds;     LYMPHATIC: no enlargement of cervical or facial nodes;     MUSCULOSKELETAL: normal gait; normal overall tone     NEUROLOGIC: mental status: alert and oriented x 3; reflexes: knee jerks: 2+;     PSYCHIATRIC:  appropriate affect and demeanor; normal speech pattern; grossly normal memory;         Assessment:         I10   Essential (primary) hypertension       Z12.5   Encounter for screening for malignant neoplasm of prostate           ORDERS:         Meds Prescribed:       [New Rx] lisinopriL 40 mg oral tablet [take 1 tablet (40 mg) by oral route once daily], #90 (ninety) tablets, Refills: 1 (one)         Lab Orders:       FUTURE  Future order to be done at patients convenience  (Send-Out)            [id number]  [facility] MALE PHYSICAL: CMP, CBC,LIPID, PSA, TSH [id number], 55792, 51574, 80239, 03760  (Send-Out)                      Plan:         Essential (primary) hypertensionBP is elevated today and recently so lisinopril is increased from 20 to 40 mg qd.         FOLLOW-UP TESTING #1: FOLLOW-UP LABORATORY:  Labs to be scheduled in the future include MALE PHYS: CMP, CBC, LIPID, PSA, TSH.            Prescriptions:       [New Rx] lisinopriL 40 mg oral tablet [take 1 tablet (40 mg) by oral route once daily], #90 (ninety) tablets, Refills: 1 (one)           Orders:       FUTURE  Future order to be done at patients convenience  (Send-Out)            [id number]  [facility] MALE PHYSICAL: CMP, CBC,LIPID, PSA, TSH [id number], 45276, 91171, 15988, 37694  (Send-Out)                  Patient Recommendations:        For  Essential (primary) hypertension:            The following laboratory testing has been ordered:              Charge Capture:         Primary Diagnosis:     I10  Essential (primary) hypertension           Orders:      [id number]  Office visit - new pt, level 2  (In-House)              Z12.5  Encounter for screening for malignant neoplasm of prostate         ADDENDUMS:      ____________________________________    Addendum: 10/21/2020 08:16 AM - Gustabo Skinner        ADDENDUM: Add [id number]; Remove [id number]             Lot #: [id number] Lot #: [id number]

## 2021-05-18 NOTE — PROGRESS NOTES
Meek Quiros  1960     Office/Outpatient Visit    Visit Date: Wed, Dec 30, 2020 09:50 am    Provider: Gayle Terry N.P. (Assistant: Alondra Sheth MA)    Location: [facility]        Electronically signed by Gayle Terry N.P. on  12/30/2020 05:53:46 PM                             Subjective:        CC: dox video [phone]. Ishaan is a 60 year old male.  This is a follow-up visit.  discuss labs         HPI: 27 minutes spent on televideo visit          dec 22 cmp notes glucose 178 with hgb a1c at 6.9%.  aic was previously 6% oct 2020.  did receive steroids while in hospital for covid 19 (dischsrged dec 12)  but he has been told he was prediabetic for many yrs.  has not altered diet in any way as result.    would like to start medication for type 2 diabetes and see dietitian.            Essential (primary) hypertension details; his current cardiac medication regimen includes an ACE inhibitor ( lisinopril ).  Compliance with treatment has been good; he takes his medication as directed.  He is tolerating the medication well without side effects.  Review of his blood pressure log reveals systolics in the 140s to 150s and diastolics in the 70s.            improved.  has oxygen at home but does not need to use it.  sats have dropped to 88 % with activity but return to mid to upper 90s upon resting.  denies concerns .      ROS:     CONSTITUTIONAL:  Negative for chills, fatigue, fever, and weight change.      CARDIOVASCULAR:  Negative for chest pain, palpitations, tachycardia, orthopnea, and edema.      RESPIRATORY:  Positive for dyspnea ( with heavy exertion ).   Negative for recent cough, pleuritic chest pain or frequent wheezing.      MUSCULOSKELETAL:  Negative for arthralgias, back pain, and myalgias.      NEUROLOGICAL:  Negative for dizziness, headaches, paresthesias, and weakness.      ENDOCRINE:  Negative for hair loss, heat/cold intolerance, polydipsia, and polyphagia.       PSYCHIATRIC:  Negative for anxiety, depression, and sleep disturbances.          Past Medical History / Family History / Social History:         Last Reviewed on 12/16/2020 02:43 PM by Gayle Terry    Past Medical History:             PAST MEDICAL HISTORY         Positive for    Hypertension; Hospitalizations: covid pneumonia dec 2020         PREVENTIVE HEALTH MAINTENANCE             COLORECTAL CANCER SCREENING: Up to date (colonoscopy q10y; sigmoidoscopy q5y; Cologuard q3y) was last done 2013; colonoscopy with normal results     PSA: was last done 10/19/20 with normal results         Surgical History:         Arthroscopy: 2 on each knee;     Hernia Repair: right inguinal;     melanoma excision;         Family History:     Father: Deep Venous Thrombosis ( s/p knee replacement );  Skin Cancer ( melanoma )     Mother:;  valvular disease         Social History:     Occupation: Retired (Prior occupation: [employer] )     Marital Status:      Children: 5 children     Hobbies/Recreation: he enjoys sports ( golf );     Mr. Quiros denies any current form of exercise.          Tobacco/Alcohol/Supplements:     Last Reviewed on 12/30/2020 09:50 AM by Alondra Sheth    Tobacco: He has a past history of cigarette smoking; quit date:  1986.          Alcohol: Frequency:    less than once a month; When he drinks, the average quantity of alcohol is 1 drinks.   He typically consumes beer.      Caffeine:  He admits to consuming caffeine via coffee ( 2 servings per day ).          Substance Abuse History:     Last Reviewed on 10/14/2020 11:37 AM by Gustabo Skinner    None         Mental Health History:     Last Reviewed on 10/14/2020 11:37 AM by Gustabo Skinner    NEGATIVE         Communicable Diseases (eg STDs):     Last Reviewed on 10/14/2020 11:37 AM by Gustabo Skinner        Current Problems:     Last Reviewed on 10/14/2020 11:37 AM by Gustabo Skinner    Essential (primary) hypertension    Encounter  for screening for malignant neoplasm of prostate    Hyperlipidemia, unspecified    Encounter for follow-up examination after completed treatment for conditions other than malignant neoplasm    Elevated white blood cell count, unspecified    Elevation of levels of liver transaminase levels    COVID-19        Immunizations:     None        Current Medications:     Last Reviewed on 12/16/2020 01:53 PM by Kami Guerra    lisinopriL 40 mg oral tablet [take 1 tablet (40 mg) by oral route once daily]    atorvastatin 10 mg oral tablet [take 1 tablet (10 mg) by oral route once daily]        Assessment:         E11.9   Type 2 diabetes mellitus without complications       I10   Essential (primary) hypertension       J12.9   Viral pneumonia, unspecified           ORDERS:         Meds Prescribed:       [New Rx] metFORMIN 500 mg oral Tablet, Extended Release Gastric Retention 24 hr [take 1 tablet (500 mg) by oral route once daily with the evening meal], #30 (thirty) tablets, Refills: 2 (two)       [New Rx] hydroCHLOROthiazide 12.5 mg oral capsule [take 1 capsule (12.5 mg) by oral route once daily], #30 (thirty) capsules, Refills: 2 (two)         Radiology/Test Orders:       [id number]  Radiologic exam chest 2 views  (Send-Out)              Procedures Ordered:       REFER  Referral to Specialist or Other Facility  (Send-Out)              Other Orders:       DLEYE  Dilated Eye Exam  (Send-Out)                      Plan:         Type 2 diabetes mellitus without complications        REFERRALS:  Referral initiated to Dietitian.  Telehealth: Verbal consent obtained for visit to occur via televideo conferencing     RECOMMENDATIONS: adherance to Low carb, NCS diet diet and annual eye exams.      COUNSELING: The patient was counseled concerning the relationship between diabetes control and macrovascular disease including cardiovascular, cerebrovascular and peripheral vascular disease. The patient was counseled concerning the relationship  between diabetes control and retinopathy, nephropathy, and neuropathy. The A1c target of <7% according to ADA and <6.5% according to AACE were discussed.            Prescriptions:       [New Rx] metFORMIN 500 mg oral Tablet, Extended Release Gastric Retention 24 hr [take 1 tablet (500 mg) by oral route once daily with the evening meal], #30 (thirty) tablets, Refills: 2 (two)           Orders:       REFER  Referral to Specialist or Other Facility  (Send-Out)            DLEYE  Dilated Eye Exam  (Send-Out)              Essential (primary) hypertension        add hctz to current lisinopril 40 mg daily.  want to see bp 130s over 80s or lower.            Prescriptions:       [New Rx] hydroCHLOROthiazide 12.5 mg oral capsule [take 1 capsule (12.5 mg) by oral route once daily], #30 (thirty) capsules, Refills: 2 (two)         Viral pneumonia, unspecified        FOLLOW-UP TESTING #1:    RADIOLOGY:  I have ordered a chest x-ray (PA and lateral) to be done today. last week of january 2021 or sooner if concerns           Orders:       [id number]  Radiologic exam chest 2 views  (Send-Out)                  Patient Recommendations:        For  Type 2 diabetes mellitus without complications:    Follow a diabetic diet as directed. Have an annual eye exam.              Charge Capture:         Primary Diagnosis:     E11.9  Type 2 diabetes mellitus without complications           Orders:      [id number]  Office/outpatient visit; established patient, level 3  (In-House)              I10  Essential (primary) hypertension     J12.9  Viral pneumonia, unspecified         ADDENDUMS:      ____________________________________    Addendum: 01/03/2021 08:54 PM - Gayle Terry        add [id number]    remove [id number]. arlene        Addendum: 03/09/2021 09:43 PM - Gayle Terry         bill the CXR under dx U07.1 Covid-19. arlene

## 2021-05-28 ENCOUNTER — HOSPITAL ENCOUNTER (OUTPATIENT)
Dept: OTHER | Facility: HOSPITAL | Age: 61
Discharge: HOME | End: 2021-05-28
Attending: NURSE PRACTITIONER

## 2021-05-28 LAB
BASOPHILS # BLD MANUAL: 0.04 10*3/UL (ref 0–0.2)
BASOPHILS NFR BLD MANUAL: 0.6 % (ref 0–3)
DEPRECATED RDW RBC AUTO: 42.4 FL
EOSINOPHIL # BLD MANUAL: 0.16 10*3/UL (ref 0–0.7)
EOSINOPHIL NFR BLD MANUAL: 2.6 % (ref 0–7)
ERYTHROCYTE [DISTWIDTH] IN BLOOD BY AUTOMATED COUNT: 12.3 % (ref 11.5–14.5)
GRANS (ABSOLUTE): 3.1 10*3/UL (ref 2–8)
GRANS: 49.5 % (ref 30–85)
HBA1C MFR BLD: 14.3 G/DL (ref 14–18)
HCT VFR BLD AUTO: 43 % (ref 42–52)
IMM GRANULOCYTES # BLD: 0.03 10*3/UL (ref 0–0.54)
IMM GRANULOCYTES NFR BLD: 0.5 % (ref 0–0.43)
LYMPHOCYTES # BLD MANUAL: 2.25 10*3/UL (ref 1–5)
LYMPHOCYTES NFR BLD MANUAL: 10.9 % (ref 3–10)
MCH RBC QN AUTO: 31 PG (ref 27–31)
MCHC RBC AUTO-ENTMCNC: 33.3 G/DL (ref 33–37)
MCV RBC AUTO: 93.3 FL (ref 80–96)
MONOCYTES # BLD AUTO: 0.68 10*3/UL (ref 0.2–1.2)
PLATELET # BLD AUTO: 256 10*3/UL (ref 130–400)
PMV BLD AUTO: 9.8 FL (ref 7.4–10.4)
RBC # BLD AUTO: 4.61 10*6/UL (ref 4.7–6.1)
VARIANT LYMPHS NFR BLD MANUAL: 35.9 % (ref 20–45)
WBC # BLD AUTO: 6.26 10*3/UL (ref 4.8–10.8)

## 2021-07-02 VITALS
HEART RATE: 50 BPM | DIASTOLIC BLOOD PRESSURE: 67 MMHG | SYSTOLIC BLOOD PRESSURE: 168 MMHG | BODY MASS INDEX: 29.11 KG/M2 | WEIGHT: 226.8 LBS | HEIGHT: 74 IN | TEMPERATURE: 97.1 F

## 2021-07-02 VITALS
DIASTOLIC BLOOD PRESSURE: 51 MMHG | SYSTOLIC BLOOD PRESSURE: 124 MMHG | TEMPERATURE: 97.9 F | HEIGHT: 74 IN | WEIGHT: 205.8 LBS | HEART RATE: 53 BPM | BODY MASS INDEX: 26.41 KG/M2

## 2021-08-03 ENCOUNTER — TELEPHONE (OUTPATIENT)
Dept: FAMILY MEDICINE CLINIC | Age: 61
End: 2021-08-03

## 2021-08-03 NOTE — TELEPHONE ENCOUNTER
Caller: Meek Quiros    Relationship: Self    Best call back number: [phone]    What is the best time to reach you: ANY    Who are you requesting to speak with (clinical staff, provider,  specific staff member): CLINICAL STAFF    What was the call regarding: PATIENT CALLED STATING THAT HE STILL HAS FATIGUE FROM COVID AND WOULD LIKE TO KNOW IF ANNA MARIE WOULD WANT HIM TO TRY A NEW MEDICATION OR IF SHE WOULD RECOMMEND THE LONGER TREATMENT OPTION OFFERED AT [facility].    Do you require a callback: YES

## 2021-08-05 NOTE — TELEPHONE ENCOUNTER
I do not understand the question.  What treatment is he referring to ?  Reviewed fatigue panel done in April and only thing I would recheck would be cbc with diff as follow up for mild anemia.

## 2021-08-11 NOTE — TELEPHONE ENCOUNTER
I was not aware of that service but have reviewed it just now.  We are not available for video visits 24 hrs a day, seven days per week as stated at that link.  He may request a visit but that is a service outside of our office to my knowledge.  My viewpoint is that if one feels as if they have long haul covid symptoms we approach and further investigate the particular symptom and go from there.

## 2021-08-11 NOTE — TELEPHONE ENCOUNTER
Pt states that if you log on to [email or web address] long, there is info that he didn't understand, he isnt sure if it just provides support or meds, he was asking if you knew anything about it

## 2021-08-12 NOTE — TELEPHONE ENCOUNTER
Pt is doing a video visit with them today.  I asked him to call  me back and let me know how that goes and what they have to offer.  He says he has good days and bad days with fatigue and just not feeling well.  Its mainly when he exerts himself

## 2021-10-15 RX ORDER — ATORVASTATIN CALCIUM 10 MG/1
TABLET, FILM COATED ORAL
Qty: 30 TABLET | Refills: 0 | Status: SHIPPED | OUTPATIENT
Start: 2021-10-15 | End: 2022-01-26 | Stop reason: SDUPTHER

## 2021-10-15 RX ORDER — LISINOPRIL 40 MG/1
TABLET ORAL
Qty: 30 TABLET | Refills: 0 | Status: SHIPPED | OUTPATIENT
Start: 2021-10-15 | End: 2021-11-10 | Stop reason: SDUPTHER

## 2021-10-26 RX ORDER — LISINOPRIL 40 MG/1
TABLET ORAL
Qty: 30 TABLET | Refills: 11 | OUTPATIENT
Start: 2021-10-26

## 2021-10-26 RX ORDER — ATORVASTATIN CALCIUM 10 MG/1
TABLET, FILM COATED ORAL
Qty: 30 TABLET | Refills: 11 | OUTPATIENT
Start: 2021-10-26

## 2021-10-27 RX ORDER — HYDROCHLOROTHIAZIDE 12.5 MG/1
CAPSULE, GELATIN COATED ORAL
Qty: 30 CAPSULE | Refills: 0 | Status: SHIPPED | OUTPATIENT
Start: 2021-10-27 | End: 2021-11-10 | Stop reason: SDUPTHER

## 2021-10-27 RX ORDER — METFORMIN HYDROCHLORIDE 500 MG/1
TABLET, EXTENDED RELEASE ORAL
Qty: 30 TABLET | Refills: 0 | Status: SHIPPED | OUTPATIENT
Start: 2021-10-27 | End: 2021-11-10 | Stop reason: SDUPTHER

## 2021-11-10 ENCOUNTER — OFFICE VISIT (OUTPATIENT)
Dept: FAMILY MEDICINE CLINIC | Age: 61
End: 2021-11-10

## 2021-11-10 VITALS
DIASTOLIC BLOOD PRESSURE: 74 MMHG | OXYGEN SATURATION: 99 % | TEMPERATURE: 98.5 F | HEART RATE: 58 BPM | BODY MASS INDEX: 28.36 KG/M2 | WEIGHT: 221 LBS | HEIGHT: 74 IN | SYSTOLIC BLOOD PRESSURE: 136 MMHG

## 2021-11-10 DIAGNOSIS — R07.9 CHEST PAIN, UNSPECIFIED TYPE: ICD-10-CM

## 2021-11-10 DIAGNOSIS — I10 PRIMARY HYPERTENSION: ICD-10-CM

## 2021-11-10 DIAGNOSIS — R35.1 NOCTURIA: ICD-10-CM

## 2021-11-10 DIAGNOSIS — Z12.11 COLON CANCER SCREENING: ICD-10-CM

## 2021-11-10 DIAGNOSIS — E11.9 TYPE 2 DIABETES MELLITUS WITHOUT COMPLICATION, WITHOUT LONG-TERM CURRENT USE OF INSULIN (HCC): ICD-10-CM

## 2021-11-10 DIAGNOSIS — Z00.00 WELL ADULT EXAM: Primary | ICD-10-CM

## 2021-11-10 DIAGNOSIS — H00.021 HORDEOLUM INTERNUM OF RIGHT UPPER EYELID: ICD-10-CM

## 2021-11-10 DIAGNOSIS — Z12.5 PROSTATE CANCER SCREENING: ICD-10-CM

## 2021-11-10 PROBLEM — E78.5 HYPERLIPIDEMIA: Status: ACTIVE | Noted: 2021-11-10

## 2021-11-10 PROCEDURE — 99396 PREV VISIT EST AGE 40-64: CPT | Performed by: NURSE PRACTITIONER

## 2021-11-10 PROCEDURE — 93000 ELECTROCARDIOGRAM COMPLETE: CPT | Performed by: FAMILY MEDICINE

## 2021-11-10 PROCEDURE — 99213 OFFICE O/P EST LOW 20 MIN: CPT | Performed by: NURSE PRACTITIONER

## 2021-11-10 RX ORDER — HYDROCHLOROTHIAZIDE 12.5 MG/1
12.5 CAPSULE, GELATIN COATED ORAL DAILY
Qty: 90 CAPSULE | Refills: 1 | Status: SHIPPED | OUTPATIENT
Start: 2021-11-10 | End: 2022-05-31

## 2021-11-10 RX ORDER — ERYTHROMYCIN 5 MG/G
OINTMENT OPHTHALMIC 2 TIMES DAILY
Qty: 1 EACH | Refills: 0 | Status: SHIPPED | OUTPATIENT
Start: 2021-11-10 | End: 2021-11-10 | Stop reason: SDUPTHER

## 2021-11-10 RX ORDER — METFORMIN HYDROCHLORIDE 500 MG/1
500 TABLET, EXTENDED RELEASE ORAL
Qty: 90 TABLET | Refills: 1 | Status: SHIPPED | OUTPATIENT
Start: 2021-11-10 | End: 2022-05-31

## 2021-11-10 RX ORDER — PNV NO.95/FERROUS FUM/FOLIC AC 28MG-0.8MG
325 TABLET ORAL DAILY
COMMUNITY

## 2021-11-10 RX ORDER — MULTIPLE VITAMINS W/ MINERALS TAB 9MG-400MCG
1 TAB ORAL DAILY
COMMUNITY

## 2021-11-10 RX ORDER — ERYTHROMYCIN 5 MG/G
OINTMENT OPHTHALMIC 2 TIMES DAILY
Qty: 1 EACH | Refills: 0 | Status: SHIPPED | OUTPATIENT
Start: 2021-11-10 | End: 2021-12-14

## 2021-11-10 RX ORDER — LISINOPRIL 40 MG/1
40 TABLET ORAL DAILY
Qty: 90 TABLET | Refills: 1 | Status: SHIPPED | OUTPATIENT
Start: 2021-11-10 | End: 2022-05-31

## 2021-11-10 NOTE — PROGRESS NOTES
"Chief Complaint  Annual Exam (Physical )    Subjective  Venu is a 61-year-old male here today with his wife for an annual wellness exam.  He had Covid infection in December 2020 and they both have concerns with long Covid symptoms namely fatigue with any significant activity.  He denies any cough or shortness of breath.  He reports chest pain or just tiring out in general with much increase in his activity level.  He had sinus bradycardia noted on EKG here in the spring.  He feels as if lung wise things have improved but he would like to get further cardiac testing as a precaution.  Blood pressure is under good control on hydrochlorothiazide 12.5 mg once daily and lisinopril 40 mg once daily.  Type 2 diabetes is under good control on Metformin extended release 500 mg once daily.  His last hemoglobin A1c was 5.4% in April.  He is currently being treated by [facility] foot [facility] ankle for a heel spur right foot.  Family history is negative heart disease.  He does state his mother had an unspecified valve issue.  History of colon polyps with last colonoscopy in 2013 normal.  He would like to get updated colon cancer screening.  He would like to have the procedure done at [facility] in [place].          Meek Quiros presents to [facility]    Review of Systems   Constitutional: Positive for fatigue. Negative for chills and fever.   HENT: Negative.    Respiratory: Negative.    Cardiovascular: Positive for chest pain. Negative for palpitations and leg swelling.   Gastrointestinal: Negative.    Genitourinary:        Nocturia   Musculoskeletal: Negative.    Neurological: Negative.    Psychiatric/Behavioral: Negative.         Objective   Vital Signs:   Vitals:    11/10/21 0923   BP: 136/74   BP Location: Left arm   Patient Position: Sitting   Cuff Size: Adult   Pulse: 58   Temp: 98.5 °F (36.9 °C)   TempSrc: Oral   SpO2: 99%   Weight: 100 kg (221 lb)   Height: 188 cm (74\") "      Physical Exam  Vitals reviewed.   Constitutional:       General: He is not in acute distress.     Appearance: Normal appearance. He is well-developed.   HENT:      Right Ear: Tympanic membrane normal.      Left Ear: Tympanic membrane normal.   Eyes:      General:         Right eye: Hordeolum present.      Comments: Hordeolum right upper eyelid and   Cardiovascular:      Rate and Rhythm: Normal rate and regular rhythm.      Heart sounds: Normal heart sounds.   Pulmonary:      Effort: Pulmonary effort is normal.      Breath sounds: Normal breath sounds.   Abdominal:      General: Abdomen is flat. Bowel sounds are normal.      Palpations: Abdomen is soft.   Musculoskeletal:         General: Normal range of motion.      Right lower leg: No edema.      Left lower leg: No edema.   Skin:     General: Skin is warm and dry.   Neurological:      General: No focal deficit present.      Mental Status: He is alert.   Psychiatric:         Attention and Perception: Attention normal.         Mood and Affect: Mood and affect normal.         Behavior: Behavior normal.          Result Review :     CMP    CMP 12/22/20 3/24/21 4/9/21   Glucose 178 (A) 112 (A) 106 (A)   BUN 13 23 22   Creatinine 0.98 0.97 1.07   Sodium 136 138 139   Potassium 4.3 4.1 4.7   Chloride 102 103 103   Calcium 9.6 9.3 9.6   Albumin 2.8 (A)  4.0   Total Bilirubin 0.60  0.34   Alkaline Phosphatase 98  63   AST (SGOT) 21  18   ALT (SGPT) 105 (A)  18   (A) Abnormal value       Comments are available for some flowsheets but are not being displayed.           CBC    CBC 12/22/20 4/9/21 5/28/21   WBC 10.23 6.18 6.26   RBC 4.60 (A) 4.08 (A) 4.61 (A)   Hemoglobin 14.20 13.00 (A) 14.30   Hematocrit 42.8 39.5 (A) 43.0   MCV 93.0 96.8 (A) 93.3   MCH 30.9 31.9 (A) 31.0   MCHC 33.2 32.9 (A) 33.3   RDW 13.0 12.8 12.3   Platelets 197.00 341.00 256.00   (A) Abnormal value            CBC w/diff    CBC w/Diff 12/22/20 4/9/21 5/28/21   WBC 10.23 6.18 6.26   RBC 4.60 (A) 4.08  (A) 4.61 (A)   Hemoglobin 14.20 13.00 (A) 14.30   Hematocrit 42.8 39.5 (A) 43.0   MCV 93.0 96.8 (A) 93.3   MCH 30.9 31.9 (A) 31.0   MCHC 33.2 32.9 (A) 33.3   RDW 13.0 12.8 12.3   Platelets 197.00 341.00 256.00   (A) Abnormal value            Lipid Panel    Lipid Panel 4/9/21   Total Cholesterol 144   Triglycerides 142   HDL Cholesterol 40   VLDL Cholesterol 28   LDL Cholesterol  76      Comments are available for some flowsheets but are not being displayed.           TSH    TSH 4/9/21   TSH 1.380           Most Recent A1C    HGBA1C Most Recent 4/9/21   Hemoglobin A1C 5.4      Comments are available for some flowsheets but are not being displayed.                    Assessment and Plan    Diagnoses and all orders for this visit:    1. Well adult exam (Primary)  Assessment & Plan:  Preventive care measures are discussed.  Declines prostate exam.    Orders:  -     Comprehensive Metabolic Panel  -     CBC & Differential  -     TSH  -     Lipid Panel    2. Nocturia  -     Urinalysis With Culture If Indicated -; Future    3. Chest pain, unspecified type  Assessment & Plan:  EKG in office today is unchanged and notes sinus bradycardia like EKG in the spring 2021.  He has been referred to cardiology for echocardiogram and further cardiac testing.  He denies any chest pain at this time.  He is going to the emergency room if he were to worsen.    Orders:  -     ECG 12 Lead  -     Ambulatory Referral to Cardiology    4. Colon cancer screening  -     Ambulatory Referral to General Surgery    5. Prostate cancer screening  -     PSA SCREENING; Future    6. Hordeolum internum of right upper eyelid  Assessment & Plan:  Okay to apply warm soaks intermittently to the right upper eyelid.  Follow-up if not improving or he may see an eye doctor.    Orders:  -     Discontinue: erythromycin (ROMYCIN) 5 MG/GM ophthalmic ointment; Administer  to the right eye 2 (Two) Times a Day.  Dispense: 1 each; Refill: 0  -     erythromycin (ROMYCIN) 5  MG/GM ophthalmic ointment; Administer  to the right eye 2 (Two) Times a Day. Apply thin ribbon to eyelid twice daily x 7-10 days  Dispense: 1 each; Refill: 0    7. Type 2 diabetes mellitus without complication, without long-term current use of insulin (HCC)  -     metFORMIN ER (GLUCOPHAGE-XR) 500 MG 24 hr tablet; Take 1 tablet by mouth Daily With Dinner.  Dispense: 90 tablet; Refill: 1  -     Hemoglobin A1c; Future    8. Primary hypertension  -     hydroCHLOROthiazide (MICROZIDE) 12.5 MG capsule; Take 1 capsule by mouth Daily.  Dispense: 90 capsule; Refill: 1  -     lisinopril (PRINIVIL,ZESTRIL) 40 MG tablet; Take 1 tablet by mouth Daily.  Dispense: 90 tablet; Refill: 1      Follow Up    No follow-ups on file.  Patient was given instructions and counseling regarding his condition or for health maintenance advice. Please see specific information pulled into the AVS if appropriate.

## 2021-11-10 NOTE — ASSESSMENT & PLAN NOTE
EKG in office today is unchanged and notes sinus bradycardia like EKG in the spring 2021.  He has been referred to cardiology for echocardiogram and further cardiac testing.  He denies any chest pain at this time.  He is going to the emergency room if he were to worsen.

## 2021-11-10 NOTE — ASSESSMENT & PLAN NOTE
Okay to apply warm soaks intermittently to the right upper eyelid.  Follow-up if not improving or he may see an eye doctor.

## 2021-11-11 NOTE — PROGRESS NOTES
Procedure     ECG 12 Lead    Date/Time: 11/10/2021 8:51 AM  Performed by: Chau Helms MD  Authorized by: Chau Helms MD   Comparison: not compared with previous ECG   Rhythm: sinus bradycardia  Rate: bradycardic  Conduction: conduction normal  ST Segments: ST segments normal  T Waves: T waves normal  QRS axis: left    Clinical impression: abnormal EKG  Comments: SINUS BRADYCARDIA WITH L.A.D.

## 2021-11-12 ENCOUNTER — LAB (OUTPATIENT)
Dept: LAB | Facility: HOSPITAL | Age: 61
End: 2021-11-12

## 2021-11-12 DIAGNOSIS — R35.1 NOCTURIA: ICD-10-CM

## 2021-11-12 DIAGNOSIS — E11.9 TYPE 2 DIABETES MELLITUS WITHOUT COMPLICATION, WITHOUT LONG-TERM CURRENT USE OF INSULIN (HCC): ICD-10-CM

## 2021-11-12 DIAGNOSIS — Z12.5 PROSTATE CANCER SCREENING: ICD-10-CM

## 2021-11-12 LAB
ALBUMIN SERPL-MCNC: 4.4 G/DL (ref 3.5–5.2)
ALBUMIN/GLOB SERPL: 1.8 G/DL
ALP SERPL-CCNC: 69 U/L (ref 39–117)
ALT SERPL W P-5'-P-CCNC: 26 U/L (ref 1–41)
ANION GAP SERPL CALCULATED.3IONS-SCNC: 5 MMOL/L (ref 5–15)
AST SERPL-CCNC: 22 U/L (ref 1–40)
BASOPHILS # BLD AUTO: 0.02 10*3/MM3 (ref 0–0.2)
BASOPHILS NFR BLD AUTO: 0.3 % (ref 0–1.5)
BILIRUB SERPL-MCNC: 0.3 MG/DL (ref 0–1.2)
BILIRUB UR QL STRIP: NEGATIVE
BUN SERPL-MCNC: 23 MG/DL (ref 8–23)
BUN/CREAT SERPL: 21.9 (ref 7–25)
CALCIUM SPEC-SCNC: 9.6 MG/DL (ref 8.6–10.5)
CHLORIDE SERPL-SCNC: 103 MMOL/L (ref 98–107)
CHOLEST SERPL-MCNC: 149 MG/DL (ref 0–200)
CLARITY UR: CLEAR
CO2 SERPL-SCNC: 29 MMOL/L (ref 22–29)
COLOR UR: ABNORMAL
CREAT SERPL-MCNC: 1.05 MG/DL (ref 0.76–1.27)
DEPRECATED RDW RBC AUTO: 40.9 FL (ref 37–54)
EOSINOPHIL # BLD AUTO: 0.19 10*3/MM3 (ref 0–0.4)
EOSINOPHIL NFR BLD AUTO: 2.7 % (ref 0.3–6.2)
ERYTHROCYTE [DISTWIDTH] IN BLOOD BY AUTOMATED COUNT: 11.8 % (ref 12.3–15.4)
GFR SERPL CREATININE-BSD FRML MDRD: 71 ML/MIN/1.73
GLOBULIN UR ELPH-MCNC: 2.5 GM/DL
GLUCOSE SERPL-MCNC: 127 MG/DL (ref 65–99)
GLUCOSE UR STRIP-MCNC: NEGATIVE MG/DL
HBA1C MFR BLD: 6.24 % (ref 4.8–5.6)
HCT VFR BLD AUTO: 45 % (ref 37.5–51)
HDLC SERPL-MCNC: 37 MG/DL (ref 40–60)
HGB BLD-MCNC: 15.1 G/DL (ref 13–17.7)
HGB UR QL STRIP.AUTO: NEGATIVE
IMM GRANULOCYTES # BLD AUTO: 0.05 10*3/MM3 (ref 0–0.05)
IMM GRANULOCYTES NFR BLD AUTO: 0.7 % (ref 0–0.5)
KETONES UR QL STRIP: ABNORMAL
LDLC SERPL CALC-MCNC: 84 MG/DL (ref 0–100)
LDLC/HDLC SERPL: 2.16 {RATIO}
LEUKOCYTE ESTERASE UR QL STRIP.AUTO: NEGATIVE
LYMPHOCYTES # BLD AUTO: 2.4 10*3/MM3 (ref 0.7–3.1)
LYMPHOCYTES NFR BLD AUTO: 34.1 % (ref 19.6–45.3)
MCH RBC QN AUTO: 31.3 PG (ref 26.6–33)
MCHC RBC AUTO-ENTMCNC: 33.6 G/DL (ref 31.5–35.7)
MCV RBC AUTO: 93.4 FL (ref 79–97)
MONOCYTES # BLD AUTO: 0.63 10*3/MM3 (ref 0.1–0.9)
MONOCYTES NFR BLD AUTO: 9 % (ref 5–12)
NEUTROPHILS NFR BLD AUTO: 3.74 10*3/MM3 (ref 1.7–7)
NEUTROPHILS NFR BLD AUTO: 53.2 % (ref 42.7–76)
NITRITE UR QL STRIP: NEGATIVE
PH UR STRIP.AUTO: <=5 [PH] (ref 5–8)
PLATELET # BLD AUTO: 282 10*3/MM3 (ref 140–450)
PMV BLD AUTO: 9.8 FL (ref 6–12)
POTASSIUM SERPL-SCNC: 4.3 MMOL/L (ref 3.5–5.2)
PROT SERPL-MCNC: 6.9 G/DL (ref 6–8.5)
PROT UR QL STRIP: NEGATIVE
PSA SERPL-MCNC: 1.26 NG/ML (ref 0–4)
RBC # BLD AUTO: 4.82 10*6/MM3 (ref 4.14–5.8)
SODIUM SERPL-SCNC: 137 MMOL/L (ref 136–145)
SP GR UR STRIP: >=1.03 (ref 1–1.03)
TRIGL SERPL-MCNC: 161 MG/DL (ref 0–150)
TSH SERPL DL<=0.05 MIU/L-ACNC: 1.41 UIU/ML (ref 0.27–4.2)
UROBILINOGEN UR QL STRIP: ABNORMAL
VLDLC SERPL-MCNC: 28 MG/DL (ref 5–40)
WBC # BLD AUTO: 7.03 10*3/MM3 (ref 3.4–10.8)

## 2021-11-12 PROCEDURE — 36415 COLL VENOUS BLD VENIPUNCTURE: CPT | Performed by: NURSE PRACTITIONER

## 2021-11-12 PROCEDURE — 80061 LIPID PANEL: CPT | Performed by: NURSE PRACTITIONER

## 2021-11-12 PROCEDURE — 80053 COMPREHEN METABOLIC PANEL: CPT | Performed by: NURSE PRACTITIONER

## 2021-11-12 PROCEDURE — 85025 COMPLETE CBC W/AUTO DIFF WBC: CPT | Performed by: NURSE PRACTITIONER

## 2021-11-12 PROCEDURE — G0103 PSA SCREENING: HCPCS

## 2021-11-12 PROCEDURE — 83036 HEMOGLOBIN GLYCOSYLATED A1C: CPT

## 2021-11-12 PROCEDURE — 81003 URINALYSIS AUTO W/O SCOPE: CPT

## 2021-11-12 PROCEDURE — 84443 ASSAY THYROID STIM HORMONE: CPT | Performed by: NURSE PRACTITIONER

## 2021-11-15 NOTE — PROGRESS NOTES
You are not anemic.  Thyroid, kidney and liver function are normal.  Triglycerides are increased most likely in relation to elevated blood sugar.

## 2021-12-14 ENCOUNTER — OFFICE VISIT (OUTPATIENT)
Dept: SURGERY | Facility: CLINIC | Age: 61
End: 2021-12-14

## 2021-12-14 ENCOUNTER — OFFICE VISIT (OUTPATIENT)
Dept: CARDIOLOGY | Facility: CLINIC | Age: 61
End: 2021-12-14

## 2021-12-14 ENCOUNTER — PREP FOR SURGERY (OUTPATIENT)
Dept: OTHER | Facility: HOSPITAL | Age: 61
End: 2021-12-14

## 2021-12-14 VITALS
HEART RATE: 56 BPM | DIASTOLIC BLOOD PRESSURE: 63 MMHG | BODY MASS INDEX: 28.62 KG/M2 | HEIGHT: 74 IN | WEIGHT: 223 LBS | SYSTOLIC BLOOD PRESSURE: 124 MMHG

## 2021-12-14 VITALS — HEIGHT: 74 IN | WEIGHT: 224 LBS | RESPIRATION RATE: 16 BRPM | BODY MASS INDEX: 28.75 KG/M2

## 2021-12-14 DIAGNOSIS — Z12.11 COLON CANCER SCREENING: Primary | ICD-10-CM

## 2021-12-14 DIAGNOSIS — I10 ESSENTIAL HYPERTENSION: ICD-10-CM

## 2021-12-14 DIAGNOSIS — E78.2 HYPERLIPEMIA, MIXED: ICD-10-CM

## 2021-12-14 DIAGNOSIS — Z12.11 SCREENING FOR COLON CANCER: Primary | ICD-10-CM

## 2021-12-14 DIAGNOSIS — R07.9 CHEST PAIN, UNSPECIFIED TYPE: Primary | ICD-10-CM

## 2021-12-14 PROCEDURE — S0285 CNSLT BEFORE SCREEN COLONOSC: HCPCS | Performed by: SURGERY

## 2021-12-14 PROCEDURE — 99204 OFFICE O/P NEW MOD 45 MIN: CPT | Performed by: INTERNAL MEDICINE

## 2021-12-14 RX ORDER — DICLOFENAC SODIUM 75 MG/1
TABLET, DELAYED RELEASE ORAL
COMMUNITY
Start: 2021-12-01 | End: 2021-12-14

## 2021-12-14 NOTE — PROGRESS NOTES
Chief Complaint  Fatigue    Subjective            Meek Quiros presents to [facility]  History of present illness:    Patient is a 61-year-old male with past medical history significant for hypertension, hyperlipidemia and diabetes.  The patient states since he had Covid back in 2020 that he has had significant exertional fatigue.  He never gets this sitting still or lying down.  He also notes a discomfort retrosternal up into his throat only with activity.  He has to stop and rest for the symptoms to go away.  They thought maybe this was due to low hemoglobin and they put him on iron and his hemoglobin came back to normal but he still maintained the symptoms.      Past Medical History:   Diagnosis Date   • Cancer (HCC) Melanoma   • Colon polyp long time ago   • Diabetes mellitus (HCC) 2020   • Hypertension Long time ago           No past surgical history on file.       Social History     Socioeconomic History   • Marital status:    Tobacco Use   • Smoking status: Former Smoker     Packs/day: 1.00     Years: 18.00     Pack years: 18.00     Types: Cigarettes     Quit date: 1986     Years since quittin.9   • Smokeless tobacco: Never Used   Vaping Use   • Vaping Use: Never used   Substance and Sexual Activity   • Alcohol use: Not Currently     Alcohol/week: 1.0 standard drink     Types: 1 Glasses of wine per week   • Drug use: Never   • Sexual activity: Yes     Partners: Female     Birth control/protection: None           Family History   Problem Relation Age of Onset   • Heart attack Mother         unspecified valve issue            No Known Allergies         Current Outpatient Medications:   •  atorvastatin (LIPITOR) 10 MG tablet, TAKE 1 TABLET DAILY, Disp: 30 tablet, Rfl: 0  •  diclofenac (VOLTAREN) 75 MG EC tablet, , Disp: , Rfl:   •  erythromycin (ROMYCIN) 5 MG/GM ophthalmic ointment, Administer  to the right eye 2 (Two) Times a Day. Apply thin ribbon to  eyelid twice daily x 7-10 days, Disp: 1 each, Rfl: 0  •  ferrous sulfate 325 (65 Fe) MG tablet, Take 325 mg by mouth Daily., Disp: , Rfl:   •  hydroCHLOROthiazide (MICROZIDE) 12.5 MG capsule, Take 1 capsule by mouth Daily., Disp: 90 capsule, Rfl: 1  •  lisinopril (PRINIVIL,ZESTRIL) 40 MG tablet, Take 1 tablet by mouth Daily., Disp: 90 tablet, Rfl: 1  •  metFORMIN ER (GLUCOPHAGE-XR) 500 MG 24 hr tablet, Take 1 tablet by mouth Daily With Dinner., Disp: 90 tablet, Rfl: 1  •  multivitamin with minerals (CENTRUM SILVER 50+MEN PO), Take 1 tablet by mouth Daily., Disp: , Rfl:       ROS:  Cardiac review of systems positive for exertional chest pain and exertional fatigue.    Objective     Wt 101 kg (223 lb)   BMI 28.63 kg/m²       General Appearance:   · well developed  · well nourished  HENT:   · oropharynx moist  · lips not cyanotic  Respiratory:  · no respiratory distress  · normal breath sounds  · no rales  Cardiovascular:  · no jugular venous distention  · regular rhythm  · S1 normal, S2 normal  · no S3, no S4   · no murmur  · no rub, no thrill  · No carotid bruit  · pedal pulses normal  · lower extremity edema: none    Musculoskeletal:  · no clubbing of fingers.   · normocephalic, head atraumatic  Skin:   · warm, dry  Psychiatric:  · judgement and insight appropriate  · normal mood and affect          Procedures                      ASSESSMENT:  Encounter Diagnoses   Name Primary?   • Chest pain, unspecified type Yes   • Essential hypertension    • Hyperlipemia, mixed          PLAN:    1.  Patient had EKG done November 10, 2021 which showed sinus bradycardia 49 bpm.  He had one back in March 2021 that showed his heart rate of 45 bpm.  There is no evidence of any significant conduction abnormalities on the EKGs.  2.  Patient had blood work done on November 12, 2021 with a normal TSH and hemoglobin.  3.  Patient cholesterol checked that showed an LDL of 84, HDL 37, and triglycerides 161.  Continue the Lipitor.  4.  I  would like to get a treadmill echo on the patient.  I would like to try to reproduce his symptoms on the treadmill and see how the heart is functioning at that time.  I do also want to make sure that he has chronotropic competence due to his mild bradycardia.  5.  If the treadmill echo looks completely normal at that time I told him he may have to look into the long Covid clinics.  6.  Blood pressures under good control.        Patient was given instructions and counseling regarding his condition or for health maintenance advice. Please see specific information pulled into the AVS if appropriate.         Huber Perdue MD   12/14/2021  12:58 EST

## 2021-12-14 NOTE — PROGRESS NOTES
Inpatient History and Physical Surgical Orders    Preadmission Location:   Preadmission Time:  Facility:  Surgery Date:  Surgery Time:  Preadmission Test date:     Chief Complaint  Outpatient History and Physical / Surgical Orders    Primary Care Provider: Gayle Terry APRN    Referring Provider: Gayle Terry APRN    Subjective      Patient Name: Meek Quiros : 1960    HPI  The patient is a 61-year-old gentleman that we have taken care of in the past.  He was referred for repeat colon screening.  He has had a colonoscopy in the past and has had some polyps removed.  He thinks it has been several years since his last colonoscopy.    Past History:  Medical History: has a past medical history of Cancer (HCC) (Melanoma), Colon polyp (long time ago), Diabetes mellitus (HCC) (2020), and Hypertension (Long time ago).   Surgical History: has no past surgical history on file.   Family History: family history includes Heart attack in his mother.   Social History: reports that he quit smoking about 35 years ago. His smoking use included cigarettes. He has a 18.00 pack-year smoking history. He has never used smokeless tobacco. He reports previous alcohol use of about 1.0 standard drink of alcohol per week. He reports that he does not use drugs.  Allergies: Patient has no known allergies.       Current Outpatient Medications:   •  atorvastatin (LIPITOR) 10 MG tablet, TAKE 1 TABLET DAILY, Disp: 30 tablet, Rfl: 0  •  diclofenac (VOLTAREN) 75 MG EC tablet, , Disp: , Rfl:   •  ferrous sulfate 325 (65 Fe) MG tablet, Take 325 mg by mouth Daily., Disp: , Rfl:   •  hydroCHLOROthiazide (MICROZIDE) 12.5 MG capsule, Take 1 capsule by mouth Daily., Disp: 90 capsule, Rfl: 1  •  lisinopril (PRINIVIL,ZESTRIL) 40 MG tablet, Take 1 tablet by mouth Daily., Disp: 90 tablet, Rfl: 1  •  metFORMIN ER (GLUCOPHAGE-XR) 500 MG 24 hr tablet, Take 1 tablet by mouth Daily With Dinner., Disp: 90 tablet, Rfl: 1  •  multivitamin with  "minerals (CENTRUM SILVER 50+MEN PO), Take 1 tablet by mouth Daily., Disp: , Rfl:   •  erythromycin (ROMYCIN) 5 MG/GM ophthalmic ointment, Administer  to the right eye 2 (Two) Times a Day. Apply thin ribbon to eyelid twice daily x 7-10 days, Disp: 1 each, Rfl: 0       Objective   Vital Signs:   Resp 16   Ht 188 cm (74\")   Wt 102 kg (224 lb)   BMI 28.76 kg/m²       Physical Exam  Vitals and nursing note reviewed.   Constitutional:       Appearance: Normal appearance. The patient is well-developed.   Cardiovascular:      Rate and Rhythm: Normal rate and regular rhythm.   Pulmonary:      Effort: Pulmonary effort is normal.      Breath sounds: Normal air entry.   Abdominal:      General: Bowel sounds are normal.      Palpations: Abdomen is soft.      Skin:     General: Skin is warm and dry.   Neurological:      Mental Status: The patient is alert and oriented to person, place, and time.      Motor: Motor function is intact.   Psychiatric:         Mood and Affect: Mood normal.       Result Review :               Assessment and Plan   Diagnoses and all orders for this visit:    1. Colon cancer screening (Primary)    We will schedule him for a colonoscopy.  Risk benefits and alternatives were explained.    I  Shahid Guerra MD  12/14/2021    "

## 2021-12-21 DIAGNOSIS — R07.9 CHRONIC CHEST PAIN WITH LOW TO MODERATE RISK FOR CAD: Primary | ICD-10-CM

## 2021-12-21 DIAGNOSIS — G89.29 CHRONIC CHEST PAIN WITH LOW TO MODERATE RISK FOR CAD: Primary | ICD-10-CM

## 2021-12-21 DIAGNOSIS — Z91.89 CHRONIC CHEST PAIN WITH LOW TO MODERATE RISK FOR CAD: Primary | ICD-10-CM

## 2022-01-04 ENCOUNTER — HOSPITAL ENCOUNTER (OUTPATIENT)
Dept: CARDIOLOGY | Facility: HOSPITAL | Age: 62
Discharge: HOME | End: 2022-01-04
Admitting: INTERNAL MEDICINE

## 2022-01-04 VITALS — HEIGHT: 74 IN | BODY MASS INDEX: 28.23 KG/M2 | WEIGHT: 220 LBS

## 2022-01-04 DIAGNOSIS — R07.9 CHRONIC CHEST PAIN WITH LOW TO MODERATE RISK FOR CAD: ICD-10-CM

## 2022-01-04 DIAGNOSIS — Z91.89 CHRONIC CHEST PAIN WITH LOW TO MODERATE RISK FOR CAD: ICD-10-CM

## 2022-01-04 DIAGNOSIS — G89.29 CHRONIC CHEST PAIN WITH LOW TO MODERATE RISK FOR CAD: ICD-10-CM

## 2022-01-04 LAB
BH CV ECHO MEAS - AO ROOT DIAM: 3.2 CM
BH CV ECHO MEAS - EDV(MOD-SP2): 80 ML
BH CV ECHO MEAS - EDV(MOD-SP4): 80 ML
BH CV ECHO MEAS - EF(MOD-BP): 58.9 %
BH CV ECHO MEAS - ESV(MOD-SP2): 32 ML
BH CV ECHO MEAS - ESV(MOD-SP4): 33 ML
BH CV ECHO MEAS - IVSD: 0.9 CM
BH CV ECHO MEAS - LA DIMENSION(2D): 4.4 CM
BH CV ECHO MEAS - LAT PEAK E' VEL: 11.7 CM/SEC
BH CV ECHO MEAS - LVIDD: 5.2 CM
BH CV ECHO MEAS - LVIDS: 3.4 CM
BH CV ECHO MEAS - LVOT DIAM: 2 CM
BH CV ECHO MEAS - LVPWD: 0.9 CM
BH CV ECHO MEAS - MED PEAK E' VEL: 9.36 CM/SEC
BH CV ECHO MEAS - MV A MAX VEL: 61 CM/SEC
BH CV ECHO MEAS - MV DEC TIME: 162 MSEC
BH CV ECHO MEAS - MV E MAX VEL: 69 CM/SEC
BH CV ECHO MEAS - MV E/A: 1.1
BH CV ECHO MEAS - RVDD: 2.7 CM
BH CV ECHO MEAS - TR MAX PG: 33 MMHG
BH CV ECHO MEASUREMENTS AVERAGE E/E' RATIO: 6.55
BH CV IMMEDIATE POST RECOVERY TECH DATA SYMPTOMS: NORMAL
BH CV IMMEDIATE POST TECH DATA BLOOD PRESSURE: NORMAL MMHG
BH CV IMMEDIATE POST TECH DATA HEART RATE: 130 BPM
BH CV IMMEDIATE POST TECH DATA OXYGEN SATS: 96 %
BH CV NINE MINUTE RECOVERY TECH DATA BLOOD PRESSURE: NORMAL MMHG
BH CV NINE MINUTE RECOVERY TECH DATA HEART RATE: 96 BPM
BH CV NINE MINUTE RECOVERY TECH DATA SYMPTOMS: NORMAL
BH CV SIX MINUTE RECOVERY TECH DATA BLOOD PRESSURE: NORMAL
BH CV SIX MINUTE RECOVERY TECH DATA HEART RATE: 99 BPM
BH CV SIX MINUTE RECOVERY TECH DATA SYMPTOMS: NORMAL
BH CV STRESS BP STAGE 1: NORMAL
BH CV STRESS BP STAGE 2: NORMAL
BH CV STRESS DURATION MIN STAGE 1: 3
BH CV STRESS DURATION MIN STAGE 2: 3
BH CV STRESS DURATION MIN STAGE 3: 1
BH CV STRESS DURATION SEC STAGE 1: 0
BH CV STRESS DURATION SEC STAGE 2: 0
BH CV STRESS DURATION SEC STAGE 3: 30
BH CV STRESS GRADE STAGE 1: 10
BH CV STRESS GRADE STAGE 2: 12
BH CV STRESS GRADE STAGE 3: 14
BH CV STRESS HR STAGE 1: 92
BH CV STRESS HR STAGE 2: 125
BH CV STRESS HR STAGE 3: 140
BH CV STRESS METS STAGE 1: 5
BH CV STRESS METS STAGE 2: 7.5
BH CV STRESS METS STAGE 3: 10
BH CV STRESS O2 STAGE 1: 97
BH CV STRESS O2 STAGE 2: 98
BH CV STRESS O2 STAGE 3: 96
BH CV STRESS PROTOCOL 1: NORMAL
BH CV STRESS RECOVERY BP: NORMAL MMHG
BH CV STRESS RECOVERY HR: 79 BPM
BH CV STRESS SPEED STAGE 1: 1.7
BH CV STRESS SPEED STAGE 2: 2.5
BH CV STRESS SPEED STAGE 3: 3.4
BH CV STRESS STAGE 1: 1
BH CV STRESS STAGE 2: 2
BH CV STRESS STAGE 3: 3
BH CV THREE MINUTE POST TECH DATA BLOOD PRESSURE: NORMAL MMHG
BH CV THREE MINUTE POST TECH DATA HEART RATE: 110 BPM
BH CV THREE MINUTE POST TECH DATA OXYGEN SATURATION: 98 %
BH CV TWELVE MINUTE RECOVERY TECH DATA BLOOD PRESSURE: NORMAL MMHG
BH CV TWELVE MINUTE RECOVERY TECH DATA HEART RATE: 81 BPM
BH CV TWELVE MINUTE RECOVERY TECH DATA SYMPTOMS: NORMAL
IVRT: 69 MSEC
LEFT ATRIUM VOLUME INDEX: 19.3 ML/M2
MAXIMAL PREDICTED HEART RATE: 157 BPM
PERCENT MAX PREDICTED HR: 89.17 %
STRESS BASELINE BP: NORMAL MMHG
STRESS BASELINE HR: 55 BPM
STRESS PERCENT HR: 105 %
STRESS POST PEAK BP: NORMAL MMHG
STRESS POST PEAK HR: 140 BPM
STRESS TARGET HR: 133 BPM

## 2022-01-04 PROCEDURE — 93016 CV STRESS TEST SUPVJ ONLY: CPT | Performed by: NURSE PRACTITIONER

## 2022-01-04 PROCEDURE — 93350 STRESS TTE ONLY: CPT | Performed by: INTERNAL MEDICINE

## 2022-01-04 PROCEDURE — 93017 CV STRESS TEST TRACING ONLY: CPT

## 2022-01-04 PROCEDURE — 93018 CV STRESS TEST I&R ONLY: CPT | Performed by: INTERNAL MEDICINE

## 2022-01-04 PROCEDURE — 93325 DOPPLER ECHO COLOR FLOW MAPG: CPT | Performed by: INTERNAL MEDICINE

## 2022-01-04 PROCEDURE — 93320 DOPPLER ECHO COMPLETE: CPT

## 2022-01-04 PROCEDURE — 93325 DOPPLER ECHO COLOR FLOW MAPG: CPT

## 2022-01-04 PROCEDURE — 93320 DOPPLER ECHO COMPLETE: CPT | Performed by: INTERNAL MEDICINE

## 2022-01-04 PROCEDURE — 93350 STRESS TTE ONLY: CPT

## 2022-01-10 ENCOUNTER — TELEPHONE (OUTPATIENT)
Dept: CARDIOLOGY | Facility: CLINIC | Age: 62
End: 2022-01-10

## 2022-01-10 NOTE — TELEPHONE ENCOUNTER
----- Message from Huber Perdue MD sent at 1/10/2022  7:47 AM EST -----  Call patient.  The stress test looked abnormal.  It appears that there could be a blockage on the artery going down the front of his heart.  I would recommend a left heart catheterization.

## 2022-01-11 NOTE — TELEPHONE ENCOUNTER
Patient returned call. Explained results of stress echo and need for heart cath. Explained procedure and advised that Dr. Perdue would discuss in more detail the morning of the procedure. Advised patient that someone would need to come with him to drive him home and nothing to eat or drink after midnight.     Please enter orders. Patient has been COVID vaccinated and would like to proceed with procedure on 1/19/2022.

## 2022-01-13 ENCOUNTER — PREP FOR SURGERY (OUTPATIENT)
Dept: OTHER | Facility: HOSPITAL | Age: 62
End: 2022-01-13

## 2022-01-13 DIAGNOSIS — R07.9 CHRONIC CHEST PAIN WITH LOW TO MODERATE RISK FOR CAD: Primary | ICD-10-CM

## 2022-01-13 DIAGNOSIS — Z91.89 CHRONIC CHEST PAIN WITH LOW TO MODERATE RISK FOR CAD: Primary | ICD-10-CM

## 2022-01-13 DIAGNOSIS — G89.29 CHRONIC CHEST PAIN WITH LOW TO MODERATE RISK FOR CAD: Primary | ICD-10-CM

## 2022-01-13 RX ORDER — SODIUM CHLORIDE 9 MG/ML
75 INJECTION, SOLUTION INTRAVENOUS CONTINUOUS
Status: CANCELLED | OUTPATIENT
Start: 2022-01-13

## 2022-01-13 RX ORDER — NITROGLYCERIN 0.4 MG/1
0.4 TABLET SUBLINGUAL
Status: CANCELLED | OUTPATIENT
Start: 2022-01-13

## 2022-01-19 ENCOUNTER — HOSPITAL ENCOUNTER (OUTPATIENT)
Facility: HOSPITAL | Age: 62
Setting detail: HOSPITAL OUTPATIENT SURGERY
Discharge: HOME | End: 2022-01-19
Attending: INTERNAL MEDICINE | Admitting: INTERNAL MEDICINE

## 2022-01-19 VITALS
WEIGHT: 220 LBS | TEMPERATURE: 97.9 F | SYSTOLIC BLOOD PRESSURE: 130 MMHG | OXYGEN SATURATION: 100 % | HEIGHT: 74 IN | DIASTOLIC BLOOD PRESSURE: 58 MMHG | HEART RATE: 48 BPM | BODY MASS INDEX: 28.23 KG/M2 | RESPIRATION RATE: 18 BRPM

## 2022-01-19 DIAGNOSIS — Z91.89 CHRONIC CHEST PAIN WITH LOW TO MODERATE RISK FOR CAD: ICD-10-CM

## 2022-01-19 DIAGNOSIS — G89.29 CHRONIC CHEST PAIN WITH LOW TO MODERATE RISK FOR CAD: ICD-10-CM

## 2022-01-19 DIAGNOSIS — R07.9 CHRONIC CHEST PAIN WITH LOW TO MODERATE RISK FOR CAD: ICD-10-CM

## 2022-01-19 LAB
ANION GAP SERPL CALCULATED.3IONS-SCNC: 9.3 MMOL/L (ref 5–15)
BUN SERPL-MCNC: 21 MG/DL (ref 8–23)
BUN/CREAT SERPL: 21.9 (ref 7–25)
CALCIUM SPEC-SCNC: 9.2 MG/DL (ref 8.6–10.5)
CHLORIDE SERPL-SCNC: 105 MMOL/L (ref 98–107)
CO2 SERPL-SCNC: 24.7 MMOL/L (ref 22–29)
CREAT SERPL-MCNC: 0.96 MG/DL (ref 0.76–1.27)
DEPRECATED RDW RBC AUTO: 43.4 FL (ref 37–54)
ERYTHROCYTE [DISTWIDTH] IN BLOOD BY AUTOMATED COUNT: 12.9 % (ref 12.3–15.4)
GFR SERPL CREATININE-BSD FRML MDRD: 79 ML/MIN/1.73
GLUCOSE SERPL-MCNC: 123 MG/DL (ref 65–99)
HCT VFR BLD AUTO: 42.3 % (ref 37.5–51)
HGB BLD-MCNC: 14.5 G/DL (ref 13–17.7)
INR PPP: 1.02 (ref 2–3)
MCH RBC QN AUTO: 31.7 PG (ref 26.6–33)
MCHC RBC AUTO-ENTMCNC: 34.3 G/DL (ref 31.5–35.7)
MCV RBC AUTO: 92.4 FL (ref 79–97)
PLATELET # BLD AUTO: 298 10*3/MM3 (ref 140–450)
PMV BLD AUTO: 9.1 FL (ref 6–12)
POTASSIUM SERPL-SCNC: 4.6 MMOL/L (ref 3.5–5.2)
PROTHROMBIN TIME: 10.7 SECONDS (ref 9.4–12)
RBC # BLD AUTO: 4.58 10*6/MM3 (ref 4.14–5.8)
SODIUM SERPL-SCNC: 139 MMOL/L (ref 136–145)
WBC NRBC COR # BLD: 6.96 10*3/MM3 (ref 3.4–10.8)

## 2022-01-19 PROCEDURE — 25010000002 HYDRALAZINE PER 20 MG: Performed by: INTERNAL MEDICINE

## 2022-01-19 PROCEDURE — 80048 BASIC METABOLIC PNL TOTAL CA: CPT | Performed by: INTERNAL MEDICINE

## 2022-01-19 PROCEDURE — 93458 L HRT ARTERY/VENTRICLE ANGIO: CPT | Performed by: INTERNAL MEDICINE

## 2022-01-19 PROCEDURE — 99152 MOD SED SAME PHYS/QHP 5/>YRS: CPT | Performed by: INTERNAL MEDICINE

## 2022-01-19 PROCEDURE — C1894 INTRO/SHEATH, NON-LASER: HCPCS | Performed by: INTERNAL MEDICINE

## 2022-01-19 PROCEDURE — C1760 CLOSURE DEV, VASC: HCPCS | Performed by: INTERNAL MEDICINE

## 2022-01-19 PROCEDURE — 85027 COMPLETE CBC AUTOMATED: CPT | Performed by: INTERNAL MEDICINE

## 2022-01-19 PROCEDURE — 99153 MOD SED SAME PHYS/QHP EA: CPT | Performed by: INTERNAL MEDICINE

## 2022-01-19 PROCEDURE — 25010000002 FENTANYL CITRATE (PF) 50 MCG/ML SOLUTION: Performed by: INTERNAL MEDICINE

## 2022-01-19 PROCEDURE — 25010000002 MIDAZOLAM PER 1 MG: Performed by: INTERNAL MEDICINE

## 2022-01-19 PROCEDURE — C1769 GUIDE WIRE: HCPCS | Performed by: INTERNAL MEDICINE

## 2022-01-19 PROCEDURE — 0 IOPAMIDOL PER 1 ML: Performed by: INTERNAL MEDICINE

## 2022-01-19 PROCEDURE — 25010000002 HEPARIN (PORCINE) PER 1000 UNITS: Performed by: INTERNAL MEDICINE

## 2022-01-19 PROCEDURE — 85610 PROTHROMBIN TIME: CPT | Performed by: INTERNAL MEDICINE

## 2022-01-19 PROCEDURE — S0260 H&P FOR SURGERY: HCPCS | Performed by: INTERNAL MEDICINE

## 2022-01-19 RX ORDER — FENTANYL CITRATE 50 UG/ML
INJECTION, SOLUTION INTRAMUSCULAR; INTRAVENOUS AS NEEDED
Status: DISCONTINUED | OUTPATIENT
Start: 2022-01-19 | End: 2022-01-19 | Stop reason: HOSPADM

## 2022-01-19 RX ORDER — ONDANSETRON 4 MG/1
4 TABLET, FILM COATED ORAL EVERY 6 HOURS PRN
Status: CANCELLED | OUTPATIENT
Start: 2022-01-19

## 2022-01-19 RX ORDER — VERAPAMIL HYDROCHLORIDE 2.5 MG/ML
INJECTION, SOLUTION INTRAVENOUS AS NEEDED
Status: DISCONTINUED | OUTPATIENT
Start: 2022-01-19 | End: 2022-01-19 | Stop reason: HOSPADM

## 2022-01-19 RX ORDER — LIDOCAINE HYDROCHLORIDE 20 MG/ML
INJECTION, SOLUTION INFILTRATION; PERINEURAL AS NEEDED
Status: DISCONTINUED | OUTPATIENT
Start: 2022-01-19 | End: 2022-01-19 | Stop reason: HOSPADM

## 2022-01-19 RX ORDER — ONDANSETRON 2 MG/ML
4 INJECTION INTRAMUSCULAR; INTRAVENOUS EVERY 6 HOURS PRN
Status: CANCELLED | OUTPATIENT
Start: 2022-01-19

## 2022-01-19 RX ORDER — HEPARIN SODIUM 1000 [USP'U]/ML
INJECTION, SOLUTION INTRAVENOUS; SUBCUTANEOUS AS NEEDED
Status: DISCONTINUED | OUTPATIENT
Start: 2022-01-19 | End: 2022-01-19 | Stop reason: HOSPADM

## 2022-01-19 RX ORDER — ACETAMINOPHEN 325 MG/1
650 TABLET ORAL EVERY 4 HOURS PRN
Status: CANCELLED | OUTPATIENT
Start: 2022-01-19

## 2022-01-19 RX ORDER — SODIUM CHLORIDE 9 MG/ML
100 INJECTION, SOLUTION INTRAVENOUS CONTINUOUS
Status: ACTIVE | OUTPATIENT
Start: 2022-01-19 | End: 2022-01-19

## 2022-01-19 RX ORDER — NITROGLYCERIN 5 MG/ML
INJECTION, SOLUTION INTRAVENOUS AS NEEDED
Status: DISCONTINUED | OUTPATIENT
Start: 2022-01-19 | End: 2022-01-19 | Stop reason: HOSPADM

## 2022-01-19 RX ORDER — HYDRALAZINE HYDROCHLORIDE 20 MG/ML
INJECTION INTRAMUSCULAR; INTRAVENOUS AS NEEDED
Status: DISCONTINUED | OUTPATIENT
Start: 2022-01-19 | End: 2022-01-19 | Stop reason: HOSPADM

## 2022-01-19 RX ORDER — MIDAZOLAM HYDROCHLORIDE 1 MG/ML
INJECTION INTRAMUSCULAR; INTRAVENOUS AS NEEDED
Status: DISCONTINUED | OUTPATIENT
Start: 2022-01-19 | End: 2022-01-19 | Stop reason: HOSPADM

## 2022-01-19 NOTE — H&P
Patient Care Team:  Gayle Terry APRN as PCP - General (Nurse Practitioner)    Chief complaint exertional chest pain.    Subjective     History of Present Illness     Patient is a 61-year-old male with past medical history significant for hypertension, hyperlipidemia and diabetes.  The patient states since he had Covid back in December 2020 that he has had significant exertional fatigue.  He never gets this sitting still or lying down.  He also notes a discomfort retrosternal up into his throat only with activity.  He has to stop and rest for the symptoms to go away.  They thought maybe this was due to low hemoglobin and they put him on iron and his hemoglobin came back to normal but he still maintained the symptoms    Review of Systems     Allergies: No known drug allergies.      Past Medical History:   Diagnosis Date   • Cancer (HCC) Melanoma   • Colon polyp long time ago   • Diabetes mellitus (HCC) Jan 2020   • Hyperlipidemia    • Hypertension Long time ago       Objective      Vital Signs  Temp:  [97.9 °F (36.6 °C)] 97.9 °F (36.6 °C)  Heart Rate:  [51] 51  Resp:  [18] 18  BP: (143)/(60) 143/60    Physical Exam    Results Review:    I reviewed the patient's new clinical results.      Assessment/Plan       Chest pain      Assessment & Plan   1.  Patient had treadmill echo in which she had EKG changes suggestive of ischemia and anterolateral wall hypokinesis at peak exercise.  Recommend left heart cath.    I discussed the patients findings and my recommendations with patient    Huber Perdue MD  01/19/22  07:23 EST

## 2022-01-26 DIAGNOSIS — E78.2 MIXED HYPERLIPIDEMIA: Primary | ICD-10-CM

## 2022-01-26 RX ORDER — ATORVASTATIN CALCIUM 10 MG/1
10 TABLET, FILM COATED ORAL DAILY
Qty: 90 TABLET | Refills: 1 | Status: SHIPPED | OUTPATIENT
Start: 2022-01-26 | End: 2022-02-23 | Stop reason: SDUPTHER

## 2022-02-23 DIAGNOSIS — E78.2 MIXED HYPERLIPIDEMIA: ICD-10-CM

## 2022-02-23 RX ORDER — ATORVASTATIN CALCIUM 10 MG/1
10 TABLET, FILM COATED ORAL DAILY
Qty: 90 TABLET | Refills: 1 | Status: SHIPPED | OUTPATIENT
Start: 2022-02-23 | End: 2022-06-22 | Stop reason: SDUPTHER

## 2022-02-28 ENCOUNTER — ANESTHESIA EVENT (OUTPATIENT)
Dept: GASTROENTEROLOGY | Facility: HOSPITAL | Age: 62
End: 2022-02-28

## 2022-02-28 ENCOUNTER — ANESTHESIA (OUTPATIENT)
Dept: GASTROENTEROLOGY | Facility: HOSPITAL | Age: 62
End: 2022-02-28

## 2022-02-28 ENCOUNTER — HOSPITAL ENCOUNTER (OUTPATIENT)
Facility: HOSPITAL | Age: 62
Setting detail: HOSPITAL OUTPATIENT SURGERY
Discharge: HOME | End: 2022-02-28
Attending: SURGERY | Admitting: SURGERY

## 2022-02-28 VITALS
OXYGEN SATURATION: 97 % | WEIGHT: 219.14 LBS | DIASTOLIC BLOOD PRESSURE: 72 MMHG | HEART RATE: 57 BPM | HEIGHT: 74 IN | BODY MASS INDEX: 28.12 KG/M2 | TEMPERATURE: 97 F | RESPIRATION RATE: 16 BRPM | SYSTOLIC BLOOD PRESSURE: 141 MMHG

## 2022-02-28 DIAGNOSIS — Z12.11 SCREENING FOR COLON CANCER: ICD-10-CM

## 2022-02-28 LAB — GLUCOSE BLDC GLUCOMTR-MCNC: 104 MG/DL (ref 70–99)

## 2022-02-28 PROCEDURE — 88305 TISSUE EXAM BY PATHOLOGIST: CPT | Performed by: SURGERY

## 2022-02-28 PROCEDURE — 82962 GLUCOSE BLOOD TEST: CPT

## 2022-02-28 PROCEDURE — 25010000002 PROPOFOL 10 MG/ML EMULSION: Performed by: NURSE ANESTHETIST, CERTIFIED REGISTERED

## 2022-02-28 RX ORDER — ONDANSETRON 4 MG/1
4 TABLET, FILM COATED ORAL ONCE AS NEEDED
Status: DISCONTINUED | OUTPATIENT
Start: 2022-02-28 | End: 2022-02-28 | Stop reason: HOSPADM

## 2022-02-28 RX ORDER — SODIUM CHLORIDE, SODIUM LACTATE, POTASSIUM CHLORIDE, CALCIUM CHLORIDE 600; 310; 30; 20 MG/100ML; MG/100ML; MG/100ML; MG/100ML
30 INJECTION, SOLUTION INTRAVENOUS CONTINUOUS
Status: DISCONTINUED | OUTPATIENT
Start: 2022-02-28 | End: 2022-02-28 | Stop reason: HOSPADM

## 2022-02-28 RX ORDER — LIDOCAINE HYDROCHLORIDE 20 MG/ML
INJECTION, SOLUTION INFILTRATION; PERINEURAL AS NEEDED
Status: DISCONTINUED | OUTPATIENT
Start: 2022-02-28 | End: 2022-02-28 | Stop reason: SURG

## 2022-02-28 RX ORDER — ONDANSETRON 2 MG/ML
4 INJECTION INTRAMUSCULAR; INTRAVENOUS ONCE AS NEEDED
Status: DISCONTINUED | OUTPATIENT
Start: 2022-02-28 | End: 2022-02-28 | Stop reason: HOSPADM

## 2022-02-28 RX ADMIN — SODIUM CHLORIDE, POTASSIUM CHLORIDE, SODIUM LACTATE AND CALCIUM CHLORIDE 30 ML/HR: 600; 310; 30; 20 INJECTION, SOLUTION INTRAVENOUS at 10:39

## 2022-02-28 RX ADMIN — PROPOFOL 250 MCG/KG/MIN: 10 INJECTION, EMULSION INTRAVENOUS at 11:03

## 2022-02-28 RX ADMIN — LIDOCAINE HYDROCHLORIDE 40 MG: 20 INJECTION, SOLUTION INFILTRATION; PERINEURAL at 11:03

## 2022-02-28 NOTE — ANESTHESIA PREPROCEDURE EVALUATION
Anesthesia Evaluation     Patient summary reviewed and Nursing notes reviewed   no history of anesthetic complications:  NPO Solid Status: > 8 hours  NPO Liquid Status: > 2 hours           Airway   Mallampati: II  TM distance: >3 FB  Neck ROM: full  No difficulty expected  Dental      Pulmonary - normal exam    breath sounds clear to auscultation  (+) a smoker Former, shortness of breath (fromCOVID),   Cardiovascular - normal exam  Exercise tolerance: good (4-7 METS)    Rhythm: regular  Rate: normal    (+) hypertension, hyperlipidemia,       Neuro/Psych- negative ROS  GI/Hepatic/Renal/Endo    (+)   diabetes mellitus,     Musculoskeletal     Abdominal    Substance History      OB/GYN          Other                        Anesthesia Plan    ASA 2     general   total IV anesthesia    Anesthetic plan, all risks, benefits, and alternatives have been provided, discussed and informed consent has been obtained with: patient.        CODE STATUS:

## 2022-02-28 NOTE — ANESTHESIA POSTPROCEDURE EVALUATION
Patient: Meek Quiros    Procedure Summary     Date: 02/28/22 Room / Location: [facility] ENDOSCOPY 1 / [facility] ENDOSCOPY    Anesthesia Start: 1101 Anesthesia Stop: 1121    Procedure: COLONOSCOPY WITH POLYPECTOMY (N/A ) Diagnosis:       Screening for colon cancer      (Screening for colon cancer [Z12.11])    Surgeons: Shahid Guerra MD Provider: Huber Millan MD    Anesthesia Type: general ASA Status: 2          Anesthesia Type: general    Vitals  Vitals Value Taken Time   /72 02/28/22 1143   Temp 36.1 °C (97 °F) 02/28/22 1143   Pulse 57 02/28/22 1143   Resp 16 02/28/22 1143   SpO2 97 % 02/28/22 1143           Post Anesthesia Care and Evaluation    Patient location during evaluation: bedside  Patient participation: complete - patient participated  Level of consciousness: awake and alert  Pain management: adequate  Airway patency: patent  Anesthetic complications: No anesthetic complications  PONV Status: none  Cardiovascular status: acceptable  Respiratory status: acceptable  Hydration status: acceptable    Comments: An Anesthesiologist personally participated in the most demanding procedures (including induction and emergence if applicable) in the anesthesia plan, monitored the course of anesthesia administration at frequent intervals and remained physically present and available for immediate diagnosis and treatment of emergencies.

## 2022-03-01 LAB
CYTO UR: NORMAL
LAB AP CASE REPORT: NORMAL
LAB AP CLINICAL INFORMATION: NORMAL
PATH REPORT.FINAL DX SPEC: NORMAL
PATH REPORT.GROSS SPEC: NORMAL

## 2022-03-08 ENCOUNTER — OFFICE VISIT (OUTPATIENT)
Dept: CARDIOLOGY | Facility: CLINIC | Age: 62
End: 2022-03-08

## 2022-03-08 VITALS
BODY MASS INDEX: 28.11 KG/M2 | DIASTOLIC BLOOD PRESSURE: 55 MMHG | HEART RATE: 59 BPM | HEIGHT: 74 IN | WEIGHT: 219 LBS | SYSTOLIC BLOOD PRESSURE: 126 MMHG

## 2022-03-08 DIAGNOSIS — I10 ESSENTIAL HYPERTENSION: ICD-10-CM

## 2022-03-08 DIAGNOSIS — R07.9 CHEST PAIN, UNSPECIFIED TYPE: Primary | ICD-10-CM

## 2022-03-08 DIAGNOSIS — E78.2 HYPERLIPEMIA, MIXED: ICD-10-CM

## 2022-03-08 PROCEDURE — 99213 OFFICE O/P EST LOW 20 MIN: CPT | Performed by: INTERNAL MEDICINE

## 2022-03-08 NOTE — PROGRESS NOTES
Chief Complaint  Hypertension and Hyperlipidemia    Subjective            Meek Quiros presents to [facility] CARDIOLOGY  History of present illness:    Patient still notes some fatigue and chest pain with exertion.  He was very bothered by a heel spur and has not been exercising recently.  He did get an injection of the heel spur and it is feeling better.      Past Medical History:   Diagnosis Date   • Cancer (HCC) Melanoma   • Colon polyp long time ago   • Diabetes mellitus (HCC) 2020   • Hyperlipidemia    • Hypertension Long time ago           Past Surgical History:   Procedure Laterality Date   • CARDIAC CATHETERIZATION N/A 2022    Procedure: Left Heart Cath;  Surgeon: Huber Perdue MD;  Location: [facility] CATH INVASIVE LOCATION;  Service: Cardiology;  Laterality: N/A;   • COLONOSCOPY     • COLONOSCOPY N/A 2022    Procedure: COLONOSCOPY WITH POLYPECTOMY;  Surgeon: Shahid Guerra MD;  Location: [facility] ENDOSCOPY;  Service: General;  Laterality: N/A;  COLON POLYP   • HERNIA REPAIR      BILATERALLY   • KNEE SURGERY Bilateral     EACH KNEE X 2 FOR TORN MENISCUS   • OTHER SURGICAL HISTORY      MELONOMA ON BACK          Social History     Socioeconomic History   • Marital status:    Tobacco Use   • Smoking status: Former Smoker     Packs/day: 1.00     Years: 18.00     Pack years: 18.00     Types: Cigarettes     Quit date: 1986     Years since quittin.2   • Smokeless tobacco: Never Used   Vaping Use   • Vaping Use: Never used   Substance and Sexual Activity   • Alcohol use: Yes     Alcohol/week: 1.0 standard drink     Types: 1 Glasses of wine per week     Comment: OCC   • Drug use: Never   • Sexual activity: Defer           Family History   Problem Relation Age of Onset   • Heart attack Mother         unspecified valve issue   • Cancer Father    • Cancer Sister    • Malig Hyperthermia Neg Hx             No Known Allergies         Current Outpatient Medications:  "  •  atorvastatin (LIPITOR) 10 MG tablet, Take 1 tablet by mouth Daily., Disp: 90 tablet, Rfl: 1  •  ferrous sulfate 325 (65 Fe) MG tablet, Take 325 mg by mouth Daily. HOLD TIL AFTER COLONOSCOPY, Disp: , Rfl:   •  hydroCHLOROthiazide (MICROZIDE) 12.5 MG capsule, Take 1 capsule by mouth Daily. (Patient taking differently: Take 12.5 mg by mouth Every Evening.), Disp: 90 capsule, Rfl: 1  •  lisinopril (PRINIVIL,ZESTRIL) 40 MG tablet, Take 1 tablet by mouth Daily., Disp: 90 tablet, Rfl: 1  •  metFORMIN ER (GLUCOPHAGE-XR) 500 MG 24 hr tablet, Take 1 tablet by mouth Daily With Dinner. (Patient taking differently: Take 500 mg by mouth Daily With Dinner. HOLD METFORMIN), Disp: 90 tablet, Rfl: 1  •  multivitamin with minerals tablet tablet, Take 1 tablet by mouth Daily., Disp: , Rfl:   •  Probiotic Product (PROBIOTIC-10 PO), , Disp: , Rfl:       ROS:  Cardiac review of systems positive for fatigue and chest pain    Objective     /55   Pulse 59   Ht 188 cm (74\")   Wt 99.3 kg (219 lb)   BMI 28.12 kg/m²       General Appearance:   · well developed  · well nourished  HENT:   · oropharynx moist  · lips not cyanotic  Respiratory:  · no respiratory distress  · normal breath sounds  · no rales  Cardiovascular:  · no jugular venous distention  · regular rhythm  · S1 normal, S2 normal  · no S3, no S4   · no murmur  · no rub, no thrill  · No carotid bruit  · pedal pulses normal  · lower extremity edema: none    Musculoskeletal:  · no clubbing of fingers.   · normocephalic, head atraumatic  Skin:   · warm, dry  Psychiatric:  · judgement and insight appropriate  · normal mood and affect          Procedures                      ASSESSMENT:  Encounter Diagnoses   Name Primary?   • Chest pain, unspecified type Yes   • Essential hypertension    • Hyperlipemia, mixed          PLAN:    1.  Patient's blood pressure is under excellent control.  2.  Agree with the Lipitor.  3.  Patient had a heart cath that showed mild nonobstructive " disease.  His ejection fraction was normal.  I did tell him to make sure that he is keeping a close eye on his A1c, blood pressure and cholesterol to prevent worsening of these mild nonobstructive blockages.  I did ask him if he wanted to continue to follow with us but he is just going to see us as needed.  4.  Patient symptoms have been going on since his Covid infection and could be consistent with a long Covid syndrome.          Patient was given instructions and counseling regarding his condition or for health maintenance advice. Please see specific information pulled into the AVS if appropriate.         Huber Perdue MD   3/8/2022  15:40 EST

## 2022-03-15 ENCOUNTER — OFFICE VISIT (OUTPATIENT)
Dept: SURGERY | Facility: CLINIC | Age: 62
End: 2022-03-15

## 2022-03-15 VITALS — RESPIRATION RATE: 16 BRPM | HEIGHT: 74 IN | BODY MASS INDEX: 28.11 KG/M2 | WEIGHT: 219 LBS

## 2022-03-15 DIAGNOSIS — Z12.11 COLON CANCER SCREENING: Primary | ICD-10-CM

## 2022-03-15 PROCEDURE — 99212 OFFICE O/P EST SF 10 MIN: CPT | Performed by: SURGERY

## 2022-03-15 NOTE — PROGRESS NOTES
Chief Complaint  Post-op    Subjective          Meek Quiros presents to [facility] GENERAL SURGERY  History of Present Illness    Meek Quiros is a 61 y.o. male  who presents today for a postoperative visit.     Patient is here for a follow-up after a recent colonoscopy.  We removed a small hyperplastic polyp from the sigmoid colon.  He is doing well.  He has had adenomas removed at a prior colonoscopy.    Past History:  Medical History: has a past medical history of Cancer (HCC) (Melanoma), Colon polyp (long time ago), Diabetes mellitus (HCC) (Jan 2020), Hyperlipidemia, and Hypertension (Long time ago).   Surgical History: has a past surgical history that includes Cardiac catheterization (N/A, 1/19/2022); Colonoscopy; Knee surgery (Bilateral); Hernia repair; Other surgical history; and Colonoscopy (N/A, 2/28/2022).   Family History: family history includes Cancer in his father and sister; Heart attack in his mother.   Social History: reports that he quit smoking about 36 years ago. His smoking use included cigarettes. He has a 18.00 pack-year smoking history. He has never used smokeless tobacco. He reports current alcohol use of about 1.0 standard drink of alcohol per week. He reports that he does not use drugs.  Allergies: Patient has no known allergies.       Current Outpatient Medications:   •  atorvastatin (LIPITOR) 10 MG tablet, Take 1 tablet by mouth Daily., Disp: 90 tablet, Rfl: 1  •  ferrous sulfate 325 (65 Fe) MG tablet, Take 325 mg by mouth Daily. HOLD TIL AFTER COLONOSCOPY, Disp: , Rfl:   •  hydroCHLOROthiazide (MICROZIDE) 12.5 MG capsule, Take 1 capsule by mouth Daily. (Patient taking differently: Take 12.5 mg by mouth Every Evening.), Disp: 90 capsule, Rfl: 1  •  lisinopril (PRINIVIL,ZESTRIL) 40 MG tablet, Take 1 tablet by mouth Daily., Disp: 90 tablet, Rfl: 1  •  metFORMIN ER (GLUCOPHAGE-XR) 500 MG 24 hr tablet, Take 1 tablet by mouth Daily With Dinner. (Patient taking  "differently: Take 500 mg by mouth Daily With Dinner. HOLD METFORMIN), Disp: 90 tablet, Rfl: 1  •  multivitamin with minerals tablet tablet, Take 1 tablet by mouth Daily., Disp: , Rfl:   •  Probiotic Product (PROBIOTIC-10 PO), , Disp: , Rfl:        Physical Exam  He appears well today and his abdomen is soft  Objective     Vital Signs:   Resp 16   Ht 188 cm (74\")   Wt 99.3 kg (219 lb)   BMI 28.12 kg/m²              Assessment and Plan    Diagnoses and all orders for this visit:    1. Colon cancer screening (Primary)    I have recommended that he repeat his colonoscopy in 5 years.  We will send a my callback letter at that time.      "

## 2022-05-31 DIAGNOSIS — E11.9 TYPE 2 DIABETES MELLITUS WITHOUT COMPLICATION, WITHOUT LONG-TERM CURRENT USE OF INSULIN: ICD-10-CM

## 2022-05-31 DIAGNOSIS — I10 PRIMARY HYPERTENSION: ICD-10-CM

## 2022-05-31 RX ORDER — LISINOPRIL 40 MG/1
TABLET ORAL
Qty: 90 TABLET | Refills: 0 | Status: SHIPPED | OUTPATIENT
Start: 2022-05-31 | End: 2022-06-22 | Stop reason: SDUPTHER

## 2022-05-31 RX ORDER — HYDROCHLOROTHIAZIDE 12.5 MG/1
CAPSULE, GELATIN COATED ORAL
Qty: 90 CAPSULE | Refills: 0 | Status: SHIPPED | OUTPATIENT
Start: 2022-05-31 | End: 2022-06-22 | Stop reason: SDUPTHER

## 2022-05-31 RX ORDER — METFORMIN HYDROCHLORIDE 500 MG/1
TABLET, EXTENDED RELEASE ORAL
Qty: 90 TABLET | Refills: 0 | Status: SHIPPED | OUTPATIENT
Start: 2022-05-31 | End: 2022-06-22 | Stop reason: SDUPTHER

## 2022-06-22 ENCOUNTER — OFFICE VISIT (OUTPATIENT)
Dept: FAMILY MEDICINE CLINIC | Age: 62
End: 2022-06-22

## 2022-06-22 VITALS
OXYGEN SATURATION: 99 % | HEART RATE: 51 BPM | SYSTOLIC BLOOD PRESSURE: 130 MMHG | BODY MASS INDEX: 27.59 KG/M2 | WEIGHT: 215 LBS | HEIGHT: 74 IN | DIASTOLIC BLOOD PRESSURE: 66 MMHG

## 2022-06-22 DIAGNOSIS — E11.9 TYPE 2 DIABETES MELLITUS WITHOUT COMPLICATION, WITHOUT LONG-TERM CURRENT USE OF INSULIN: ICD-10-CM

## 2022-06-22 DIAGNOSIS — R35.1 NOCTURIA: ICD-10-CM

## 2022-06-22 DIAGNOSIS — R53.83 OTHER FATIGUE: ICD-10-CM

## 2022-06-22 DIAGNOSIS — I10 PRIMARY HYPERTENSION: ICD-10-CM

## 2022-06-22 DIAGNOSIS — E78.2 MIXED HYPERLIPIDEMIA: Primary | ICD-10-CM

## 2022-06-22 DIAGNOSIS — R23.3 EASY BRUISING: ICD-10-CM

## 2022-06-22 PROBLEM — R07.9 CHEST PAIN: Status: RESOLVED | Noted: 2021-11-10 | Resolved: 2022-06-22

## 2022-06-22 PROBLEM — Z00.00 WELL ADULT EXAM: Status: RESOLVED | Noted: 2021-11-10 | Resolved: 2022-06-22

## 2022-06-22 PROCEDURE — 99214 OFFICE O/P EST MOD 30 MIN: CPT | Performed by: NURSE PRACTITIONER

## 2022-06-22 RX ORDER — METFORMIN HYDROCHLORIDE 500 MG/1
500 TABLET, EXTENDED RELEASE ORAL
Qty: 90 TABLET | Refills: 1 | Status: SHIPPED | OUTPATIENT
Start: 2022-06-22 | End: 2022-08-25

## 2022-06-22 RX ORDER — HYDROCHLOROTHIAZIDE 12.5 MG/1
12.5 CAPSULE, GELATIN COATED ORAL DAILY
Qty: 90 CAPSULE | Refills: 1 | Status: SHIPPED | OUTPATIENT
Start: 2022-06-22 | End: 2022-08-25

## 2022-06-22 RX ORDER — ATORVASTATIN CALCIUM 10 MG/1
10 TABLET, FILM COATED ORAL DAILY
Qty: 90 TABLET | Refills: 1 | Status: SHIPPED | OUTPATIENT
Start: 2022-06-22 | End: 2022-08-25

## 2022-06-22 RX ORDER — LISINOPRIL 40 MG/1
40 TABLET ORAL DAILY
Qty: 90 TABLET | Refills: 1 | Status: SHIPPED | OUTPATIENT
Start: 2022-06-22 | End: 2022-08-25

## 2022-06-22 NOTE — PROGRESS NOTES
"Answers for HPI/ROS submitted by the patient on 6/20/2022  What is the primary reason for your visit?: Physical    Chief Complaint  Diabetes (Follow up ), Hypertension, and Hyperlipidemia    Subjective  Patient is a 62-year-old male who is here today to follow-up on essential hypertension.  He takes lisinopril 40 mg in the morning and hydrochlorothiazide 12.5 mg in the evening.  He reports blood pressures under good control and denies medication side effects.    He does get up twice at night to urinate.  Gets 4 cups of coffee during the day and drinks fluids right up until bedtime.  Also was taking hydrochlorothiazide in the evening with dinner and at the same time as metformin.  He did have a recent colonoscopy.  He denies difficulty with starting or stopping his urinary stream or dysuria.  PSA was normal in November.    For type 2 diabetes he takes metformin extended release 500 mg once daily.  Denies side effects and requests refills.  Average fasting blood sugars in the 120s.  Had eye exam earlier this month.  Denies any signs of neuropathy.  Admits his diet has not been as healthy recently.        Meek Quiros presents to [facility] FAM[facility] MED[facility]INE          Objective   Vital Signs:   Vitals:    06/22/22 0909 06/22/22 0935   BP: 145/60 130/66   BP Location: Left arm Left arm   Patient Position: Sitting Sitting   Cuff Size: Adult Large Adult   Pulse: 51    SpO2: 99%    Weight: 97.5 kg (215 lb)    Height: 188 cm (74\")       Body mass index is 27.6 kg/m².  Physical Exam  Vitals reviewed.   Constitutional:       General: He is not in acute distress.     Appearance: Normal appearance. He is well-developed.   Cardiovascular:      Rate and Rhythm: Normal rate and regular rhythm.      Pulses:           Dorsalis pedis pulses are 2+ on the right side and 2+ on the left side.        Posterior tibial pulses are 2+ on the right side and 2+ on the left side.      Heart sounds: Normal heart sounds. "   Pulmonary:      Effort: Pulmonary effort is normal.      Breath sounds: Normal breath sounds.   Musculoskeletal:      Right lower leg: No edema.      Left lower leg: No edema.   Feet:      Right foot:      Protective Sensation: 3 sites tested. 3 sites sensed.      Skin integrity: Skin integrity normal. No ulcer or blister.      Toenail Condition: Right toenails are normal.      Left foot:      Protective Sensation: 3 sites tested. 3 sites sensed.      Skin integrity: Skin integrity normal. No ulcer or blister.      Toenail Condition: Left toenails are normal.      Comments:      Skin:     General: Skin is warm and dry.   Neurological:      General: No focal deficit present.      Mental Status: He is alert.   Psychiatric:         Attention and Perception: Attention normal.         Mood and Affect: Mood and affect normal.         Behavior: Behavior normal.          Result Review :     CMP    CMP 11/12/21 1/19/22   Glucose 127 (A) 123 (A)   BUN 23 21   Creatinine 1.05 0.96   eGFR Non African Am 71 79   Sodium 137 139   Potassium 4.3 4.6   Chloride 103 105   Calcium 9.6 9.2   Albumin 4.40    Total Bilirubin 0.3    Alkaline Phosphatase 69    AST (SGOT) 22    ALT (SGPT) 26    (A) Abnormal value       Comments are available for some flowsheets but are not being displayed.           CBC    CBC 11/12/21 1/19/22   WBC 7.03 6.96   RBC 4.82 4.58   Hemoglobin 15.1 14.5   Hematocrit 45.0 42.3   MCV 93.4 92.4   MCH 31.3 31.7   MCHC 33.6 34.3   RDW 11.8 (A) 12.9   Platelets 282 298   (A) Abnormal value            CBC w/diff    CBC w/Diff 11/12/21 1/19/22   WBC 7.03 6.96   RBC 4.82 4.58   Hemoglobin 15.1 14.5   Hematocrit 45.0 42.3   MCV 93.4 92.4   MCH 31.3 31.7   MCHC 33.6 34.3   RDW 11.8 (A) 12.9   Platelets 282 298   Neutrophil Rel % 53.2    Immature Granulocyte Rel % 0.7 (A)    Lymphocyte Rel % 34.1    Monocyte Rel % 9.0    Eosinophil Rel % 2.7    Basophil Rel % 0.3    (A) Abnormal value            Lipid Panel    Lipid Panel  11/12/21   Total Cholesterol 149   Triglycerides 161 (A)   HDL Cholesterol 37 (A)   VLDL Cholesterol 28   LDL Cholesterol  84   LDL/HDL Ratio 2.16   (A) Abnormal value            TSH    TSH 11/12/21   TSH 1.410           Most Recent A1C    HGBA1C Most Recent 11/12/21   Hemoglobin A1C 6.24 (A)   (A) Abnormal value                     Assessment and Plan {WrapupProblem List  Visit Diagnosis  ROS  Review (Popup)  Health Maintenance  Quality  BestPractice  Medications  SmartSets  SnapShot Encounters  Media :23}   Diagnoses and all orders for this visit:    1. Mixed hyperlipidemia (Primary)  -     atorvastatin (LIPITOR) 10 MG tablet; Take 1 tablet by mouth Daily.  Dispense: 90 tablet; Refill: 1    2. Primary hypertension  -     lisinopril (PRINIVIL,ZESTRIL) 40 MG tablet; Take 1 tablet by mouth Daily.  Dispense: 90 tablet; Refill: 1  -     hydroCHLOROthiazide (MICROZIDE) 12.5 MG capsule; Take 1 capsule by mouth Daily.  Dispense: 90 capsule; Refill: 1    3. Type 2 diabetes mellitus without complication, without long-term current use of insulin (HCC)  -     metFORMIN ER (GLUCOPHAGE-XR) 500 MG 24 hr tablet; Take 1 tablet by mouth Daily With Dinner.  Dispense: 90 tablet; Refill: 1  -     Comprehensive metabolic panel; Future  -     Hemoglobin A1c; Future  -     Lipid panel; Future    4. Easy bruising  -     CBC w AUTO Differential; Future    5. Nocturia  Assessment & Plan:  Take hydrochlorothiazide in the morning instead of the evening.  Gradually decrease caffeine intake from 4 times daily to 2 times daily.  Also hold fluids 60 to 90 minutes prior to bedtime.  Follow-up if not improving.      6. Other fatigue  Assessment & Plan:  Consider sleep study.  He does feel as if having COVID last year was a triggering event.        Follow Up    No follow-ups on file.  Patient was given instructions and counseling regarding his condition or for health maintenance advice. Please see specific information pulled into the AVS  if appropriate.

## 2022-06-22 NOTE — ASSESSMENT & PLAN NOTE
Take hydrochlorothiazide in the morning instead of the evening.  Gradually decrease caffeine intake from 4 times daily to 2 times daily.  Also hold fluids 60 to 90 minutes prior to bedtime.  Follow-up if not improving.

## 2022-06-27 ENCOUNTER — LAB (OUTPATIENT)
Dept: LAB | Facility: HOSPITAL | Age: 62
End: 2022-06-27

## 2022-06-27 DIAGNOSIS — E11.9 TYPE 2 DIABETES MELLITUS WITHOUT COMPLICATION, WITHOUT LONG-TERM CURRENT USE OF INSULIN: ICD-10-CM

## 2022-06-27 DIAGNOSIS — R23.3 EASY BRUISING: ICD-10-CM

## 2022-06-27 LAB
ALBUMIN SERPL-MCNC: 4.3 G/DL (ref 3.5–5.2)
ALBUMIN/GLOB SERPL: 1.4 G/DL
ALP SERPL-CCNC: 62 U/L (ref 39–117)
ALT SERPL W P-5'-P-CCNC: 22 U/L (ref 1–41)
ANION GAP SERPL CALCULATED.3IONS-SCNC: 11.3 MMOL/L (ref 5–15)
AST SERPL-CCNC: 20 U/L (ref 1–40)
BASOPHILS # BLD AUTO: 0.05 10*3/MM3 (ref 0–0.2)
BASOPHILS NFR BLD AUTO: 0.7 % (ref 0–1.5)
BILIRUB SERPL-MCNC: 0.4 MG/DL (ref 0–1.2)
BUN SERPL-MCNC: 30 MG/DL (ref 8–23)
BUN/CREAT SERPL: 28.3 (ref 7–25)
CALCIUM SPEC-SCNC: 9.7 MG/DL (ref 8.6–10.5)
CHLORIDE SERPL-SCNC: 103 MMOL/L (ref 98–107)
CHOLEST SERPL-MCNC: 159 MG/DL (ref 0–200)
CO2 SERPL-SCNC: 25.7 MMOL/L (ref 22–29)
CREAT SERPL-MCNC: 1.06 MG/DL (ref 0.76–1.27)
DEPRECATED RDW RBC AUTO: 43.8 FL (ref 37–54)
EGFRCR SERPLBLD CKD-EPI 2021: 78.4 ML/MIN/1.73
EOSINOPHIL # BLD AUTO: 0.12 10*3/MM3 (ref 0–0.4)
EOSINOPHIL NFR BLD AUTO: 1.7 % (ref 0.3–6.2)
ERYTHROCYTE [DISTWIDTH] IN BLOOD BY AUTOMATED COUNT: 12.4 % (ref 12.3–15.4)
GLOBULIN UR ELPH-MCNC: 3 GM/DL
GLUCOSE SERPL-MCNC: 115 MG/DL (ref 65–99)
HBA1C MFR BLD: 6.1 % (ref 4.8–5.6)
HCT VFR BLD AUTO: 43.6 % (ref 37.5–51)
HDLC SERPL-MCNC: 37 MG/DL (ref 40–60)
HGB BLD-MCNC: 14.3 G/DL (ref 13–17.7)
IMM GRANULOCYTES # BLD AUTO: 0.04 10*3/MM3 (ref 0–0.05)
IMM GRANULOCYTES NFR BLD AUTO: 0.6 % (ref 0–0.5)
LDLC SERPL CALC-MCNC: 92 MG/DL (ref 0–100)
LDLC/HDLC SERPL: 2.37 {RATIO}
LYMPHOCYTES # BLD AUTO: 2.39 10*3/MM3 (ref 0.7–3.1)
LYMPHOCYTES NFR BLD AUTO: 33.6 % (ref 19.6–45.3)
MCH RBC QN AUTO: 31.5 PG (ref 26.6–33)
MCHC RBC AUTO-ENTMCNC: 32.8 G/DL (ref 31.5–35.7)
MCV RBC AUTO: 96 FL (ref 79–97)
MONOCYTES # BLD AUTO: 0.76 10*3/MM3 (ref 0.1–0.9)
MONOCYTES NFR BLD AUTO: 10.7 % (ref 5–12)
NEUTROPHILS NFR BLD AUTO: 3.75 10*3/MM3 (ref 1.7–7)
NEUTROPHILS NFR BLD AUTO: 52.7 % (ref 42.7–76)
PLATELET # BLD AUTO: 270 10*3/MM3 (ref 140–450)
PMV BLD AUTO: 9.7 FL (ref 6–12)
POTASSIUM SERPL-SCNC: 5.2 MMOL/L (ref 3.5–5.2)
PROT SERPL-MCNC: 7.3 G/DL (ref 6–8.5)
RBC # BLD AUTO: 4.54 10*6/MM3 (ref 4.14–5.8)
SODIUM SERPL-SCNC: 140 MMOL/L (ref 136–145)
TRIGL SERPL-MCNC: 171 MG/DL (ref 0–150)
VLDLC SERPL-MCNC: 30 MG/DL (ref 5–40)
WBC NRBC COR # BLD: 7.11 10*3/MM3 (ref 3.4–10.8)

## 2022-06-27 PROCEDURE — 83036 HEMOGLOBIN GLYCOSYLATED A1C: CPT

## 2022-06-27 PROCEDURE — 80053 COMPREHEN METABOLIC PANEL: CPT

## 2022-06-27 PROCEDURE — 85025 COMPLETE CBC W/AUTO DIFF WBC: CPT

## 2022-06-27 PROCEDURE — 36415 COLL VENOUS BLD VENIPUNCTURE: CPT

## 2022-06-27 PROCEDURE — 80061 LIPID PANEL: CPT

## 2022-07-05 NOTE — PROGRESS NOTES
Diabetes is under good control.  Kidney and liver function are normal.  You are not anemic.  Lipid panel is mostly stable.

## 2022-08-25 DIAGNOSIS — I10 PRIMARY HYPERTENSION: ICD-10-CM

## 2022-08-25 DIAGNOSIS — E11.9 TYPE 2 DIABETES MELLITUS WITHOUT COMPLICATION, WITHOUT LONG-TERM CURRENT USE OF INSULIN: ICD-10-CM

## 2022-08-25 DIAGNOSIS — E78.2 MIXED HYPERLIPIDEMIA: ICD-10-CM

## 2022-08-25 RX ORDER — METFORMIN HYDROCHLORIDE 500 MG/1
TABLET, EXTENDED RELEASE ORAL
Qty: 90 TABLET | Refills: 1 | Status: SHIPPED | OUTPATIENT
Start: 2022-08-25 | End: 2022-12-20 | Stop reason: SDUPTHER

## 2022-08-25 RX ORDER — HYDROCHLOROTHIAZIDE 12.5 MG/1
CAPSULE, GELATIN COATED ORAL
Qty: 90 CAPSULE | Refills: 1 | Status: SHIPPED | OUTPATIENT
Start: 2022-08-25 | End: 2022-12-20 | Stop reason: DRUGHIGH

## 2022-08-25 RX ORDER — ATORVASTATIN CALCIUM 10 MG/1
TABLET, FILM COATED ORAL
Qty: 90 TABLET | Refills: 1 | Status: SHIPPED | OUTPATIENT
Start: 2022-08-25 | End: 2022-12-20 | Stop reason: SDUPTHER

## 2022-08-25 RX ORDER — LISINOPRIL 40 MG/1
TABLET ORAL
Qty: 90 TABLET | Refills: 1 | Status: SHIPPED | OUTPATIENT
Start: 2022-08-25 | End: 2022-12-20 | Stop reason: SDUPTHER

## 2022-12-20 ENCOUNTER — OFFICE VISIT (OUTPATIENT)
Dept: FAMILY MEDICINE CLINIC | Age: 62
End: 2022-12-20

## 2022-12-20 ENCOUNTER — LAB (OUTPATIENT)
Dept: LAB | Facility: HOSPITAL | Age: 62
End: 2022-12-20

## 2022-12-20 VITALS
HEART RATE: 85 BPM | HEIGHT: 74 IN | DIASTOLIC BLOOD PRESSURE: 70 MMHG | BODY MASS INDEX: 28.77 KG/M2 | OXYGEN SATURATION: 97 % | SYSTOLIC BLOOD PRESSURE: 140 MMHG | WEIGHT: 224.2 LBS

## 2022-12-20 DIAGNOSIS — E11.9 TYPE 2 DIABETES MELLITUS WITHOUT COMPLICATION, WITHOUT LONG-TERM CURRENT USE OF INSULIN: Primary | ICD-10-CM

## 2022-12-20 DIAGNOSIS — Z12.5 PROSTATE CANCER SCREENING: ICD-10-CM

## 2022-12-20 DIAGNOSIS — Z86.2 HISTORY OF ANEMIA: ICD-10-CM

## 2022-12-20 DIAGNOSIS — I10 PRIMARY HYPERTENSION: ICD-10-CM

## 2022-12-20 DIAGNOSIS — E78.2 MIXED HYPERLIPIDEMIA: ICD-10-CM

## 2022-12-20 DIAGNOSIS — Z23 NEEDS FLU SHOT: ICD-10-CM

## 2022-12-20 DIAGNOSIS — E11.9 TYPE 2 DIABETES MELLITUS WITHOUT COMPLICATION, WITHOUT LONG-TERM CURRENT USE OF INSULIN: ICD-10-CM

## 2022-12-20 PROBLEM — R23.3 EASY BRUISING: Status: RESOLVED | Noted: 2022-06-22 | Resolved: 2022-12-20

## 2022-12-20 PROBLEM — R35.1 NOCTURIA: Status: RESOLVED | Noted: 2021-11-10 | Resolved: 2022-12-20

## 2022-12-20 LAB
ALBUMIN SERPL-MCNC: 4.3 G/DL (ref 3.5–5.2)
ALBUMIN UR-MCNC: 3 MG/DL
ALBUMIN/GLOB SERPL: 1.7 G/DL
ALP SERPL-CCNC: 70 U/L (ref 39–117)
ALT SERPL W P-5'-P-CCNC: 18 U/L (ref 1–41)
ANION GAP SERPL CALCULATED.3IONS-SCNC: 9.5 MMOL/L (ref 5–15)
AST SERPL-CCNC: 15 U/L (ref 1–40)
BASOPHILS # BLD AUTO: 0.07 10*3/MM3 (ref 0–0.2)
BASOPHILS NFR BLD AUTO: 0.5 % (ref 0–1.5)
BILIRUB SERPL-MCNC: 0.4 MG/DL (ref 0–1.2)
BUN SERPL-MCNC: 18 MG/DL (ref 8–23)
BUN/CREAT SERPL: 18.2 (ref 7–25)
CALCIUM SPEC-SCNC: 9.6 MG/DL (ref 8.6–10.5)
CHLORIDE SERPL-SCNC: 106 MMOL/L (ref 98–107)
CHOLEST SERPL-MCNC: 142 MG/DL (ref 0–200)
CO2 SERPL-SCNC: 26.5 MMOL/L (ref 22–29)
CREAT SERPL-MCNC: 0.99 MG/DL (ref 0.76–1.27)
CREAT UR-MCNC: 150.9 MG/DL
DEPRECATED RDW RBC AUTO: 42.7 FL (ref 37–54)
EGFRCR SERPLBLD CKD-EPI 2021: 85.1 ML/MIN/1.73
EOSINOPHIL # BLD AUTO: 0.12 10*3/MM3 (ref 0–0.4)
EOSINOPHIL NFR BLD AUTO: 0.9 % (ref 0.3–6.2)
ERYTHROCYTE [DISTWIDTH] IN BLOOD BY AUTOMATED COUNT: 12.5 % (ref 12.3–15.4)
GLOBULIN UR ELPH-MCNC: 2.6 GM/DL
GLUCOSE SERPL-MCNC: 120 MG/DL (ref 65–99)
HBA1C MFR BLD: 6.6 % (ref 4.8–5.6)
HCT VFR BLD AUTO: 46.7 % (ref 37.5–51)
HDLC SERPL-MCNC: 38 MG/DL (ref 40–60)
HGB BLD-MCNC: 15.6 G/DL (ref 13–17.7)
IMM GRANULOCYTES # BLD AUTO: 0.04 10*3/MM3 (ref 0–0.05)
IMM GRANULOCYTES NFR BLD AUTO: 0.3 % (ref 0–0.5)
IRON 24H UR-MRATE: 53 MCG/DL (ref 59–158)
IRON SATN MFR SERPL: 13 % (ref 20–50)
LDLC SERPL CALC-MCNC: 86 MG/DL (ref 0–100)
LDLC/HDLC SERPL: 2.23 {RATIO}
LYMPHOCYTES # BLD AUTO: 1.58 10*3/MM3 (ref 0.7–3.1)
LYMPHOCYTES NFR BLD AUTO: 12.2 % (ref 19.6–45.3)
MCH RBC QN AUTO: 30.6 PG (ref 26.6–33)
MCHC RBC AUTO-ENTMCNC: 33.4 G/DL (ref 31.5–35.7)
MCV RBC AUTO: 91.6 FL (ref 79–97)
MICROALBUMIN/CREAT UR: 19.9 MG/G
MONOCYTES # BLD AUTO: 1.1 10*3/MM3 (ref 0.1–0.9)
MONOCYTES NFR BLD AUTO: 8.5 % (ref 5–12)
NEUTROPHILS NFR BLD AUTO: 10 10*3/MM3 (ref 1.7–7)
NEUTROPHILS NFR BLD AUTO: 77.6 % (ref 42.7–76)
PLATELET # BLD AUTO: 269 10*3/MM3 (ref 140–450)
PMV BLD AUTO: 8.8 FL (ref 6–12)
POTASSIUM SERPL-SCNC: 4.7 MMOL/L (ref 3.5–5.2)
PROT SERPL-MCNC: 6.9 G/DL (ref 6–8.5)
PSA SERPL-MCNC: 1.35 NG/ML (ref 0–4)
RBC # BLD AUTO: 5.1 10*6/MM3 (ref 4.14–5.8)
SODIUM SERPL-SCNC: 142 MMOL/L (ref 136–145)
TIBC SERPL-MCNC: 399 MCG/DL (ref 298–536)
TRANSFERRIN SERPL-MCNC: 268 MG/DL (ref 200–360)
TRIGL SERPL-MCNC: 96 MG/DL (ref 0–150)
VLDLC SERPL-MCNC: 18 MG/DL (ref 5–40)
WBC NRBC COR # BLD: 12.91 10*3/MM3 (ref 3.4–10.8)

## 2022-12-20 PROCEDURE — 80053 COMPREHEN METABOLIC PANEL: CPT

## 2022-12-20 PROCEDURE — 90471 IMMUNIZATION ADMIN: CPT | Performed by: NURSE PRACTITIONER

## 2022-12-20 PROCEDURE — 82570 ASSAY OF URINE CREATININE: CPT

## 2022-12-20 PROCEDURE — 82043 UR ALBUMIN QUANTITATIVE: CPT

## 2022-12-20 PROCEDURE — 84466 ASSAY OF TRANSFERRIN: CPT

## 2022-12-20 PROCEDURE — 83540 ASSAY OF IRON: CPT

## 2022-12-20 PROCEDURE — G0103 PSA SCREENING: HCPCS

## 2022-12-20 PROCEDURE — 80061 LIPID PANEL: CPT

## 2022-12-20 PROCEDURE — 99214 OFFICE O/P EST MOD 30 MIN: CPT | Performed by: NURSE PRACTITIONER

## 2022-12-20 PROCEDURE — 90686 IIV4 VACC NO PRSV 0.5 ML IM: CPT | Performed by: NURSE PRACTITIONER

## 2022-12-20 PROCEDURE — 36415 COLL VENOUS BLD VENIPUNCTURE: CPT

## 2022-12-20 PROCEDURE — 85025 COMPLETE CBC W/AUTO DIFF WBC: CPT

## 2022-12-20 PROCEDURE — 83036 HEMOGLOBIN GLYCOSYLATED A1C: CPT

## 2022-12-20 RX ORDER — ATORVASTATIN CALCIUM 10 MG/1
10 TABLET, FILM COATED ORAL DAILY
Qty: 90 TABLET | Refills: 1 | Status: SHIPPED | OUTPATIENT
Start: 2022-12-20

## 2022-12-20 RX ORDER — LISINOPRIL 40 MG/1
40 TABLET ORAL DAILY
Qty: 90 TABLET | Refills: 1 | Status: SHIPPED | OUTPATIENT
Start: 2022-12-20

## 2022-12-20 RX ORDER — METFORMIN HYDROCHLORIDE 500 MG/1
500 TABLET, EXTENDED RELEASE ORAL
Qty: 90 TABLET | Refills: 1 | Status: SHIPPED | OUTPATIENT
Start: 2022-12-20

## 2022-12-20 RX ORDER — HYDROCHLOROTHIAZIDE 25 MG/1
25 TABLET ORAL DAILY
Qty: 30 TABLET | Refills: 0 | Status: SHIPPED | OUTPATIENT
Start: 2022-12-20 | End: 2023-02-16 | Stop reason: SDUPTHER

## 2022-12-20 NOTE — PROGRESS NOTES
"Answers for HPI/ROS submitted by the patient on 12/18/2022  What is the primary reason for your visit?: Diabetes  Diabetes type: type 2  MedicAlert ID: No  Disease duration: 2 Years    Chief Complaint  Hypertension, Hyperlipidemia, Diabetes, Nocturia, and Easy bruising    Subjective          Meek Quiros presents to [facility]     Patient is a 62-year-old male who is here today to follow-up regarding essential hypertension.  He takes lisinopril 40 mg daily and hydrochlorothiazide 12.5 mg daily.  Denies side effects and requests refills.  He does feel as if blood pressure is running a little higher than it was previously.  He has less nocturia since changing his hydrochlorothiazide dosage from bedtime to every morning.    Regarding hyperlipidemia he takes atorvastatin 10 mg every morning.  He denies side effects and requests refills.  He reports that diet has not been as healthy as it has been formerly.    Regarding type 2 diabetes he takes metformin extended release 500 mg once daily with a meal.  Last hemoglobin A1c was 6.1.  He denies side effects and requests refills.  He had an eye exam in June.    He denies any dysuria or difficulty starting or stopping his urinary stream.     Objective   Vital Signs:   Vitals:    12/20/22 0842 12/20/22 0921   BP: 146/80 140/70   BP Location: Left arm Left arm   Patient Position: Sitting Sitting   Cuff Size: Adult Large Adult   Pulse: 85    SpO2: 97%    Weight: 102 kg (224 lb 3.2 oz)    Height: 188 cm (74\")       Body mass index is 28.79 kg/m².  Physical Exam  Vitals reviewed.   Constitutional:       General: He is not in acute distress.     Appearance: Normal appearance. He is well-developed.   Cardiovascular:      Rate and Rhythm: Normal rate and regular rhythm.      Pulses:           Dorsalis pedis pulses are 2+ on the right side and 2+ on the left side.      Heart sounds: Normal heart sounds.   Pulmonary:      Effort: Pulmonary effort is " normal.      Breath sounds: Normal breath sounds.   Musculoskeletal:      Right lower leg: No edema.      Left lower leg: No edema.   Feet:      Right foot:      Protective Sensation: 3 sites tested. 3 sites sensed.      Skin integrity: Skin integrity normal. No ulcer or blister.      Toenail Condition: Right toenails are normal.      Left foot:      Protective Sensation: 3 sites tested. 3 sites sensed.      Skin integrity: Skin integrity normal. No ulcer or blister.      Toenail Condition: Left toenails are normal.      Comments:      Skin:     General: Skin is warm and dry.   Neurological:      General: No focal deficit present.      Mental Status: He is alert.   Psychiatric:         Attention and Perception: Attention normal.         Mood and Affect: Mood and affect normal.         Behavior: Behavior normal.           Current Outpatient Medications:   •  atorvastatin (LIPITOR) 10 MG tablet, Take 1 tablet by mouth Daily., Disp: 90 tablet, Rfl: 1  •  ferrous sulfate 325 (65 Fe) MG tablet, Take 325 mg by mouth Daily. HOLD TIL AFTER COLONOSCOPY, Disp: , Rfl:   •  lisinopril (PRINIVIL,ZESTRIL) 40 MG tablet, Take 1 tablet by mouth Daily., Disp: 90 tablet, Rfl: 1  •  metFORMIN ER (GLUCOPHAGE-XR) 500 MG 24 hr tablet, Take 1 tablet by mouth Daily With Dinner., Disp: 90 tablet, Rfl: 1  •  multivitamin with minerals tablet tablet, Take 1 tablet by mouth Daily., Disp: , Rfl:   •  hydroCHLOROthiazide (HYDRODIURIL) 25 MG tablet, Take 1 tablet by mouth Daily., Disp: 30 tablet, Rfl: 0       Result Review :     CMP    CMP 1/19/22 6/27/22   Glucose 123 (A) 115 (A)   BUN 21 30 (A)   Creatinine 0.96 1.06   eGFR Non African Am 79    Sodium 139 140   Potassium 4.6 5.2   Chloride 105 103   Calcium 9.2 9.7   Albumin  4.30   Total Bilirubin  0.4   Alkaline Phosphatase  62   AST (SGOT)  20   ALT (SGPT)  22   (A) Abnormal value       Comments are available for some flowsheets but are not being displayed.           CBC    CBC 1/19/22  6/27/22 12/20/22   WBC 6.96 7.11 12.91 (A)   RBC 4.58 4.54 5.10   Hemoglobin 14.5 14.3 15.6   Hematocrit 42.3 43.6 46.7   MCV 92.4 96.0 91.6   MCH 31.7 31.5 30.6   MCHC 34.3 32.8 33.4   RDW 12.9 12.4 12.5   Platelets 298 270 269   (A) Abnormal value            CBC w/diff    CBC w/Diff 1/19/22 6/27/22 12/20/22   WBC 6.96 7.11 12.91 (A)   RBC 4.58 4.54 5.10   Hemoglobin 14.5 14.3 15.6   Hematocrit 42.3 43.6 46.7   MCV 92.4 96.0 91.6   MCH 31.7 31.5 30.6   MCHC 34.3 32.8 33.4   RDW 12.9 12.4 12.5   Platelets 298 270 269   Neutrophil Rel %  52.7 77.6 (A)   Immature Granulocyte Rel %  0.6 (A) 0.3   Lymphocyte Rel %  33.6 12.2 (A)   Monocyte Rel %  10.7 8.5   Eosinophil Rel %  1.7 0.9   Basophil Rel %  0.7 0.5   (A) Abnormal value            Lipid Panel    Lipid Panel 6/27/22   Total Cholesterol 159   Triglycerides 171 (A)   HDL Cholesterol 37 (A)   VLDL Cholesterol 30   LDL Cholesterol  92   LDL/HDL Ratio 2.37   (A) Abnormal value            Most Recent A1C    HGBA1C Most Recent 6/27/22   Hemoglobin A1C 6.10 (A)   (A) Abnormal value                     Assessment and Plan    Diagnoses and all orders for this visit:    1. Type 2 diabetes mellitus without complication, without long-term current use of insulin (HCC) (Primary)  -     Comprehensive metabolic panel; Future  -     Hemoglobin A1c; Future  -     Lipid panel; Future  -     Microalbumin / Creatinine Urine Ratio - Urine, Clean Catch; Future  -     metFORMIN ER (GLUCOPHAGE-XR) 500 MG 24 hr tablet; Take 1 tablet by mouth Daily With Dinner.  Dispense: 90 tablet; Refill: 1    2. Prostate cancer screening  -     PSA SCREENING; Future    3. Mixed hyperlipidemia  -     atorvastatin (LIPITOR) 10 MG tablet; Take 1 tablet by mouth Daily.  Dispense: 90 tablet; Refill: 1    4. Primary hypertension  Assessment & Plan:  Continue lisinopril 40 mg daily.  Increase hydrochlorothiazide to 25 mg daily.  Follow-up if blood pressure control is not improved within the next 3 to 4  weeks.    Orders:  -     lisinopril (PRINIVIL,ZESTRIL) 40 MG tablet; Take 1 tablet by mouth Daily.  Dispense: 90 tablet; Refill: 1  -     hydroCHLOROthiazide (HYDRODIURIL) 25 MG tablet; Take 1 tablet by mouth Daily.  Dispense: 30 tablet; Refill: 0    5. Needs flu shot  -     FluLaval/Fluzone >6 mos ([id number])    6. History of anemia  -     CBC w AUTO Differential; Future  -     Iron and TIBC; Future      Follow Up    No follow-ups on file.  Patient was given instructions and counseling regarding his condition or for health maintenance advice. Please see specific information pulled into the AVS if appropriate.

## 2022-12-20 NOTE — ASSESSMENT & PLAN NOTE
Continue lisinopril 40 mg daily.  Increase hydrochlorothiazide to 25 mg daily.  Follow-up if blood pressure control is not improved within the next 3 to 4 weeks.

## 2022-12-21 NOTE — PROGRESS NOTES
Diabetes is under good control.  Prostate blood test is normal.  Kidney and liver function are normal.  Anemia is stable on current iron supplement.  Lipid panel is stable.

## 2023-02-16 DIAGNOSIS — I10 PRIMARY HYPERTENSION: ICD-10-CM

## 2023-02-16 RX ORDER — HYDROCHLOROTHIAZIDE 25 MG/1
25 TABLET ORAL DAILY
Qty: 90 TABLET | Refills: 0 | Status: SHIPPED | OUTPATIENT
Start: 2023-02-16 | End: 2023-03-06 | Stop reason: SDUPTHER

## 2023-06-20 PROBLEM — Z11.59 SCREENING FOR VIRAL DISEASE: Status: ACTIVE | Noted: 2023-06-20

## 2023-06-20 PROBLEM — Z12.11 COLON CANCER SCREENING: Status: RESOLVED | Noted: 2021-11-10 | Resolved: 2023-06-20

## 2023-06-20 PROBLEM — Z12.5 PROSTATE CANCER SCREENING: Status: RESOLVED | Noted: 2021-11-10 | Resolved: 2023-06-20

## 2023-06-20 PROBLEM — D64.9 ANEMIA: Status: ACTIVE | Noted: 2023-06-20

## 2023-06-20 PROBLEM — Z23 NEEDS FLU SHOT: Status: RESOLVED | Noted: 2022-12-20 | Resolved: 2023-06-20

## 2023-07-11 PROBLEM — J40 BRONCHITIS: Status: ACTIVE | Noted: 2023-07-11

## 2023-07-11 PROBLEM — R05.1 ACUTE COUGH: Status: ACTIVE | Noted: 2023-07-11

## 2023-08-17 ENCOUNTER — OFFICE VISIT (OUTPATIENT)
Dept: FAMILY MEDICINE CLINIC | Age: 63
End: 2023-08-17
Payer: MEDICARE

## 2023-08-17 ENCOUNTER — HOSPITAL ENCOUNTER (OUTPATIENT)
Dept: GENERAL RADIOLOGY | Facility: HOSPITAL | Age: 63
Discharge: HOME | End: 2023-08-17
Admitting: NURSE PRACTITIONER
Payer: MEDICARE

## 2023-08-17 VITALS
OXYGEN SATURATION: 98 % | WEIGHT: 225 LBS | DIASTOLIC BLOOD PRESSURE: 70 MMHG | HEIGHT: 74 IN | BODY MASS INDEX: 28.88 KG/M2 | SYSTOLIC BLOOD PRESSURE: 150 MMHG | HEART RATE: 52 BPM

## 2023-08-17 DIAGNOSIS — M54.41 ACUTE BILATERAL LOW BACK PAIN WITH BILATERAL SCIATICA: Primary | ICD-10-CM

## 2023-08-17 DIAGNOSIS — M54.41 ACUTE BILATERAL LOW BACK PAIN WITH BILATERAL SCIATICA: ICD-10-CM

## 2023-08-17 DIAGNOSIS — M54.42 ACUTE BILATERAL LOW BACK PAIN WITH BILATERAL SCIATICA: ICD-10-CM

## 2023-08-17 DIAGNOSIS — M54.42 ACUTE BILATERAL LOW BACK PAIN WITH BILATERAL SCIATICA: Primary | ICD-10-CM

## 2023-08-17 PROCEDURE — 3077F SYST BP >= 140 MM HG: CPT | Performed by: NURSE PRACTITIONER

## 2023-08-17 PROCEDURE — 1160F RVW MEDS BY RX/DR IN RCRD: CPT | Performed by: NURSE PRACTITIONER

## 2023-08-17 PROCEDURE — 1159F MED LIST DOCD IN RCRD: CPT | Performed by: NURSE PRACTITIONER

## 2023-08-17 PROCEDURE — 3078F DIAST BP <80 MM HG: CPT | Performed by: NURSE PRACTITIONER

## 2023-08-17 PROCEDURE — 3044F HG A1C LEVEL LT 7.0%: CPT | Performed by: NURSE PRACTITIONER

## 2023-08-17 PROCEDURE — 72100 X-RAY EXAM L-S SPINE 2/3 VWS: CPT

## 2023-08-17 PROCEDURE — 99213 OFFICE O/P EST LOW 20 MIN: CPT | Performed by: NURSE PRACTITIONER

## 2023-08-17 RX ORDER — BACLOFEN 10 MG/1
10 TABLET ORAL 3 TIMES DAILY PRN
Qty: 40 TABLET | Refills: 0 | Status: SHIPPED | OUTPATIENT
Start: 2023-08-17

## 2023-08-17 NOTE — PROGRESS NOTES
"Chief Complaint  Back Pain (Patient c/o low back pain for 2 weeks states he is unsure how he injured it, states he had back injury near that location years ago but this feels different )    Subjective          Meek Quiros presents to [facility]     Patient is a 63-year-old male who is here today to discuss low back pain present x2 weeks.  Not related to any current injury.  He does have history of bulging disc diagnosed in the 1980s after an injury at that time.  He has not had any imaging of his spine done since 2014.  He does get some intermittent numbness and tingling in bilateral feet.  Pain has been moderate to severe.  He has been laying on the floor with towels under his knees and taking Tylenol, Advil, or Tylenol PM with limited benefit.  He feels as if pain is contributing to his elevated blood pressure here today.  He does monitor blood pressure at home and has taken his lisinopril 40 mg earlier today.     Objective   Vital Signs:   Vitals:    08/17/23 1109 08/17/23 1139   BP: 156/63 150/70   BP Location: Left arm Right arm   Patient Position: Sitting Sitting   Cuff Size: Adult Adult   Pulse: 52    SpO2: 98%    Weight: 102 kg (225 lb)    Height: 188 cm (74\")    PainSc:   6    PainLoc: Back        Wt Readings from Last 3 Encounters:   08/17/23 102 kg (225 lb)   07/11/23 101 kg (222 lb)   06/20/23 101 kg (221 lb 12.8 oz)      BP Readings from Last 3 Encounters:   08/17/23 150/70   07/11/23 114/69   06/20/23 134/76       Body mass index is 28.89 kg/mý.           Physical Exam  Vitals reviewed.   Constitutional:       General: He is not in acute distress.     Appearance: Normal appearance. He is well-developed.   Cardiovascular:      Rate and Rhythm: Normal rate and regular rhythm.      Heart sounds: Normal heart sounds.   Pulmonary:      Effort: Pulmonary effort is normal.      Breath sounds: Normal breath sounds.   Musculoskeletal:      Right lower leg: No edema.      " Left lower leg: No edema.   Skin:     General: Skin is warm and dry.   Neurological:      General: No focal deficit present.      Mental Status: He is alert.   Psychiatric:         Attention and Perception: Attention normal.         Mood and Affect: Mood and affect normal.         Behavior: Behavior normal.         Current Outpatient Medications:     atorvastatin (LIPITOR) 10 MG tablet, Take 1 tablet by mouth Daily., Disp: 90 tablet, Rfl: 1    ferrous sulfate 325 (65 Fe) MG tablet, Take 1 tablet by mouth Daily. HOLD TIL AFTER COLONOSCOPY, Disp: , Rfl:     fluticasone (FLONASE) 50 MCG/ACT nasal spray, 2 sprays into the nostril(s) as directed by provider As Needed., Disp: , Rfl:     hydroCHLOROthiazide (HYDRODIURIL) 25 MG tablet, Take 1 tablet by mouth Daily., Disp: 90 tablet, Rfl: 1    lisinopril (PRINIVIL,ZESTRIL) 40 MG tablet, Take 1 tablet by mouth Daily., Disp: 90 tablet, Rfl: 1    metFORMIN ER (GLUCOPHAGE-XR) 500 MG 24 hr tablet, Take 1 tablet by mouth Daily With Dinner., Disp: 90 tablet, Rfl: 1    multivitamin with minerals tablet tablet, Take 1 tablet by mouth Daily., Disp: , Rfl:     baclofen (LIORESAL) 10 MG tablet, Take 1 tablet by mouth 3 (Three) Times a Day As Needed for Muscle Spasms. May cause drowsiness, Disp: 40 tablet, Rfl: 0    diclofenac (VOLTAREN) 50 MG EC tablet, Take 1 tablet by mouth 2 (Two) Times a Day As Needed (pain)., Disp: 60 tablet, Rfl: 0   Past Medical History:   Diagnosis Date    Anemia 6/20/2023    Cancer Melanoma    Colon polyp long time ago    Diabetes mellitus Jan 2020    Hyperlipidemia     Hypertension Long time ago     No Known Allergies            Result Review :     Common labs          12/20/2022    09:40 12/20/2022    09:47 6/20/2023    08:34   Common Labs   Glucose 120   133    BUN 18   24    Creatinine 0.99   1.04    Sodium 142   138    Potassium 4.7   4.6    Chloride 106   104    Calcium 9.6   9.5    Albumin 4.30   4.5    Total Bilirubin 0.4   0.5    Alkaline Phosphatase  70   65    AST (SGOT) 15   21    ALT (SGPT) 18   24    WBC 12.91   6.66    Hemoglobin 15.6   14.4    Hematocrit 46.7   42.8    Platelets 269   284    Total Cholesterol 142   166    Triglycerides 96   239    HDL Cholesterol 38   35    LDL Cholesterol  86   91    Hemoglobin A1C 6.60   6.50    Microalbumin, Urine  3.0     PSA 1.350           No Images in the past 120 days found..           Social History     Tobacco Use   Smoking Status Former    Packs/day: 1.00    Years: 18.00    Pack years: 18.00    Types: Cigarettes    Quit date: 1986    Years since quittin.6   Smokeless Tobacco Never   Tobacco Comments    Started at age 7           Assessment and Plan    Diagnoses and all orders for this visit:    1. Acute bilateral low back pain with bilateral sciatica (Primary)  Assessment & Plan:  Baclofen may cause drowsiness.  Consider physical therapy.  If symptoms or not improving over the next few weeks may need to consider MRI lumbar spine without contrast.    Orders:  -     XR Spine Lumbar 2 or 3 View; Future  -     baclofen (LIORESAL) 10 MG tablet; Take 1 tablet by mouth 3 (Three) Times a Day As Needed for Muscle Spasms. May cause drowsiness  Dispense: 40 tablet; Refill: 0  -     diclofenac (VOLTAREN) 50 MG EC tablet; Take 1 tablet by mouth 2 (Two) Times a Day As Needed (pain).  Dispense: 60 tablet; Refill: 0        Follow Up    No follow-ups on file.  Patient was given instructions and counseling regarding his condition or for health maintenance advice. Please see specific information pulled into the AVS if appropriate.

## 2023-08-17 NOTE — ASSESSMENT & PLAN NOTE
Baclofen may cause drowsiness.  Consider physical therapy.  If symptoms or not improving over the next few weeks may need to consider MRI lumbar spine without contrast.

## 2023-08-18 NOTE — PROGRESS NOTES
Arthritic change is noted.  Atherosclerosis is noted but you are managing cholesterol on atorvastatin.  Recommend physical therapy if not improving.

## 2023-09-10 LAB
FLUAV AG NPH QL: NEGATIVE
FLUBV AG NPH QL IA: NEGATIVE

## 2023-09-10 PROCEDURE — 87804 INFLUENZA ASSAY W/OPTIC: CPT

## 2023-09-10 PROCEDURE — 99283 EMERGENCY DEPT VISIT LOW MDM: CPT

## 2023-09-10 PROCEDURE — 87635 SARS-COV-2 COVID-19 AMP PRB: CPT

## 2023-09-11 ENCOUNTER — HOSPITAL ENCOUNTER (EMERGENCY)
Facility: HOSPITAL | Age: 63
Discharge: HOME | End: 2023-09-11
Attending: EMERGENCY MEDICINE | Admitting: EMERGENCY MEDICINE
Payer: MEDICARE

## 2023-09-11 ENCOUNTER — APPOINTMENT (OUTPATIENT)
Dept: GENERAL RADIOLOGY | Facility: HOSPITAL | Age: 63
End: 2023-09-11
Payer: MEDICARE

## 2023-09-11 VITALS
SYSTOLIC BLOOD PRESSURE: 165 MMHG | HEART RATE: 78 BPM | RESPIRATION RATE: 18 BRPM | HEIGHT: 74 IN | TEMPERATURE: 100 F | OXYGEN SATURATION: 97 % | BODY MASS INDEX: 29.26 KG/M2 | WEIGHT: 227.96 LBS | DIASTOLIC BLOOD PRESSURE: 65 MMHG

## 2023-09-11 DIAGNOSIS — U07.1 COVID-19: Primary | ICD-10-CM

## 2023-09-11 DIAGNOSIS — R50.9 FEVER, UNSPECIFIED FEVER CAUSE: ICD-10-CM

## 2023-09-11 LAB — SARS-COV-2 RNA RESP QL NAA+PROBE: DETECTED

## 2023-09-11 PROCEDURE — 71045 X-RAY EXAM CHEST 1 VIEW: CPT

## 2023-09-11 RX ORDER — BENZONATATE 100 MG/1
200 CAPSULE ORAL 3 TIMES DAILY PRN
Qty: 30 CAPSULE | Refills: 0 | Status: SHIPPED | OUTPATIENT
Start: 2023-09-11 | End: 2023-09-21

## 2023-09-11 RX ORDER — ACETAMINOPHEN 325 MG/1
650 TABLET ORAL EVERY 6 HOURS PRN
Status: DISCONTINUED | OUTPATIENT
Start: 2023-09-11 | End: 2023-09-11 | Stop reason: HOSPADM

## 2023-09-11 RX ADMIN — ACETAMINOPHEN 650 MG: 325 TABLET ORAL at 01:19

## 2023-09-11 NOTE — ED PROVIDER NOTES
Time: 9:55 PM EDT  Date of encounter:  9/10/2023  Independent Historian/Clinical History and Information was obtained by:   Patient    History is limited by: N/A    Chief Complaint   Patient presents with    Exposure To Known Illness         History of Present Illness:  Patient is a 63 y.o. year old male who presents to the emergency department for evaluation of COVID symptoms. Symptoms started yesterday. Has had recent exposure to COVID.  Symptoms include nonproductive cough, fever, fatigue, headache and body aches. Fever started today. Has taken tylenol PM earlier today. States last time he had COVID in 2020 he ended up hospitalized for hypoxia so was concerned.  The patient denies any chest pain or shortness of breath.  The patient's had no nausea or vomiting.  The patient's had no diarrhea.  The patient is no neck stiffness.  The patient has no rash.    HPI    Patient Care Team  Primary Care Provider: Gayle Terry APRN    Past Medical History:     No Known Allergies  Past Medical History:   Diagnosis Date    Anemia 6/20/2023    Cancer Melanoma    Colon polyp long time ago    Diabetes mellitus Jan 2020    Hyperlipidemia     Hypertension Long time ago     Past Surgical History:   Procedure Laterality Date    CARDIAC CATHETERIZATION N/A 1/19/2022    Procedure: Left Heart Cath;  Surgeon: Huber Perdue MD;  Location: [facility] CATH INVASIVE LOCATION;  Service: Cardiology;  Laterality: N/A;    COLONOSCOPY      COLONOSCOPY N/A 2/28/2022    Procedure: COLONOSCOPY WITH POLYPECTOMY;  Surgeon: Shahid Guerra MD;  Location: [facility] ENDOSCOPY;  Service: General;  Laterality: N/A;  COLON POLYP    HERNIA REPAIR      BILATERALLY    KNEE SURGERY Bilateral     EACH KNEE X 2 FOR TORN MENISCUS    OTHER SURGICAL HISTORY      MELONOMA ON BACK     Family History   Problem Relation Age of Onset    Heart attack Mother         unspecified valve issue    Cancer Father     Cancer Sister     Malig Hyperthermia Neg Hx         Home Medications:  Prior to Admission medications    Medication Sig Start Date End Date Taking? Authorizing Provider   atorvastatin (LIPITOR) 10 MG tablet Take 1 tablet by mouth Daily. 23   Gayle Terry APRN   baclofen (LIORESAL) 10 MG tablet Take 1 tablet by mouth 3 (Three) Times a Day As Needed for Muscle Spasms. May cause drowsiness 23   Gayle Terry APRN   diclofenac (VOLTAREN) 50 MG EC tablet Take 1 tablet by mouth 2 (Two) Times a Day As Needed (pain). 23   Gayle Terry APRN   ferrous sulfate 325 (65 Fe) MG tablet Take 1 tablet by mouth Daily. HOLD TIL AFTER COLONOSCOPY    ProviderTeo MD   fluticasone (FLONASE) 50 MCG/ACT nasal spray 2 sprays into the nostril(s) as directed by provider As Needed. 23   ProviderTeo MD   hydroCHLOROthiazide (HYDRODIURIL) 25 MG tablet Take 1 tablet by mouth Daily. 23   Gayle Terry APRN   lisinopril (PRINIVIL,ZESTRIL) 40 MG tablet Take 1 tablet by mouth Daily. 23   Gayle Terry APRN   metFORMIN ER (GLUCOPHAGE-XR) 500 MG 24 hr tablet Take 1 tablet by mouth Daily With Dinner. 23   Gayle Terry APRN   multivitamin with minerals tablet tablet Take 1 tablet by mouth Daily.    Provider, MD Teo        Social History:   Social History     Tobacco Use    Smoking status: Former     Packs/day: 1.00     Years: 18.00     Pack years: 18.00     Types: Cigarettes     Quit date: 1986     Years since quittin.7    Smokeless tobacco: Never    Tobacco comments:     Started at age 7   Vaping Use    Vaping Use: Never used   Substance Use Topics    Alcohol use: Yes     Alcohol/week: 1.0 standard drink     Types: 1 Glasses of wine per week     Comment: OCC    Drug use: Never         Review of Systems:  Review of Systems   Constitutional:  Positive for chills, fatigue and fever. Negative for diaphoresis.   HENT:  Positive for congestion. Negative for postnasal drip, rhinorrhea, sore throat, trouble  "swallowing and voice change.    Eyes:  Negative for photophobia.   Respiratory:  Positive for cough. Negative for chest tightness and shortness of breath.    Cardiovascular:  Negative for chest pain, palpitations and leg swelling.   Gastrointestinal:  Negative for abdominal pain, diarrhea, nausea and vomiting.   Genitourinary:  Negative for difficulty urinating, dysuria, flank pain, frequency, hematuria and urgency.   Musculoskeletal:  Positive for myalgias. Negative for neck pain and neck stiffness.   Skin:  Negative for pallor and rash.   Neurological:  Positive for headaches. Negative for dizziness, syncope, weakness and numbness.   Hematological:  Negative for adenopathy. Does not bruise/bleed easily.   Psychiatric/Behavioral: Negative.     All other systems reviewed and are negative.     Physical Exam:  /65   Pulse 78   Temp 100 °F (37.8 °C) (Oral)   Resp 18   Ht 188 cm (74\")   Wt 103 kg (227 lb 15.3 oz)   SpO2 97%   BMI 29.27 kg/m²         Physical Exam  Vitals and nursing note reviewed.   Constitutional:       General: He is not in acute distress.     Appearance: Normal appearance. He is not ill-appearing, toxic-appearing or diaphoretic.   HENT:      Head: Normocephalic and atraumatic.      Nose: Congestion present.      Mouth/Throat:      Mouth: Mucous membranes are moist.      Pharynx: No oropharyngeal exudate or posterior oropharyngeal erythema.   Eyes:      Extraocular Movements: Extraocular movements intact.      Conjunctiva/sclera: Conjunctivae normal.      Pupils: Pupils are equal, round, and reactive to light.   Cardiovascular:      Rate and Rhythm: Normal rate and regular rhythm.      Pulses: Normal pulses.           Carotid pulses are 2+ on the right side and 2+ on the left side.       Radial pulses are 2+ on the right side and 2+ on the left side.        Femoral pulses are 2+ on the right side and 2+ on the left side.       Popliteal pulses are 2+ on the right side and 2+ on the left " side.        Dorsalis pedis pulses are 2+ on the right side and 2+ on the left side.        Posterior tibial pulses are 2+ on the right side and 2+ on the left side.      Heart sounds: Normal heart sounds. No murmur heard.  Pulmonary:      Effort: Pulmonary effort is normal. No accessory muscle usage, respiratory distress or retractions.      Breath sounds: Normal breath sounds. No wheezing, rhonchi or rales.   Abdominal:      General: Abdomen is flat. There is no distension.      Palpations: Abdomen is soft. There is no mass or pulsatile mass.      Tenderness: There is no abdominal tenderness. There is no right CVA tenderness, left CVA tenderness, guarding or rebound.      Comments: No rigidity   Musculoskeletal:         General: No swelling, tenderness or deformity.      Cervical back: Neck supple. No rigidity or tenderness. No spinous process tenderness or muscular tenderness. Normal range of motion.      Right lower leg: No edema.      Left lower leg: No edema.   Lymphadenopathy:      Cervical:      Right cervical: Superficial cervical adenopathy present.      Left cervical: Superficial cervical adenopathy present.   Skin:     General: Skin is warm and dry.      Capillary Refill: Capillary refill takes less than 2 seconds.      Coloration: Skin is not cyanotic, jaundiced or pale.      Findings: No erythema.   Neurological:      General: No focal deficit present.      Mental Status: He is alert and oriented to person, place, and time. Mental status is at baseline.      Cranial Nerves: Cranial nerves 2-12 are intact. No cranial nerve deficit.      Sensory: Sensation is intact. No sensory deficit.      Motor: Motor function is intact. No weakness or pronator drift.      Coordination: Coordination is intact. Coordination normal.   Psychiatric:         Attention and Perception: Attention and perception normal.         Mood and Affect: Mood normal.         Behavior: Behavior normal.                     Procedures:  Procedures      Medical Decision Making:      Comorbidities that affect care:    Hypertension, diabetes, hyperlipidemia    External Notes reviewed:    None      The following orders were placed and all results were independently analyzed by me:  Orders Placed This Encounter   Procedures    COVID PRE-OP / PRE-PROCEDURE SCREENING ORDER (NO ISOLATION) - Swab, Nasopharynx    COVID-19,CEPHEID/KELBY,COR/DOMINIK/PAD/PEPE/MAD IN-HOUSE(OR EMERGENT/ADD-ON),NP SWAB IN TRANSPORT MEDIA 3-4 HR TAT, RT-PCR - Swab, Nasopharynx    Influenza Antigen, Rapid - Swab, Nasopharynx    XR Chest 1 View       Medications Given in the Emergency Department:  Medications - No data to display       ED Course:    The patient was initially evaluated in the triage area where orders were placed. The patient was later dispositioned by Huber Noriega DO.      The patient was advised to stay for completion of workup which includes but is not limited to communication of labs and radiological results, reassessment and plan. The patient was advised that leaving prior to disposition by a provider could result in critical findings that are not communicated to the patient.          Labs:    Lab Results (last 24 hours)       ** No results found for the last 24 hours. **             Imaging:    No Radiology Exams Resulted Within Past 24 Hours      Differential Diagnosis and Discussion:      Fever: Based on the complaint of fever, differential diagnosis includes but is not limited to meningitis, pneumonia, pyelonephritis, acute uti,  systemic immune response syndrome, sepsis, viral syndrome, fungal infection, tick born illness and other bacterial infections.    All labs were reviewed and interpreted by me.  All X-rays impressions were independently interpreted by me.    MDM  Number of Diagnoses or Management Options  COVID-19  Fever, unspecified fever cause  Diagnosis management comments: Patient's vital signs were stable in the emergency room.  The  patient's room air pulse ox was normal    Patient's COVID was positive  Patient's flu was negative    Patient's chest x-ray was clear    The patient was given very specific instructions on when and why to return to the emergency room.  The patient voiced understanding and felt comfortable with the discharge instructions.  They would return to the emergency room if necessary.  The patient appears appropriate for discharge and outpatient follow-up.         Amount and/or Complexity of Data Reviewed  Clinical lab tests: reviewed  Tests in the radiology section of CPT®: reviewed         Social Determinants of Health:    Patient is independent, reliable, and has access to care.       Disposition and Care Coordination:    Discharged: The patient is suitable and stable for discharge with no need for consideration of observation or admission.    I have explained discharge medications and the need for follow up with the patient/caretakers. This was also printed in the discharge instructions. Patient was discharged with the following medications and follow up:      Medication List        New Prescriptions      benzonatate 100 MG capsule  Commonly known as: TESSALON  Take 2 capsules by mouth 3 (Three) Times a Day As Needed for Cough for up to 10 days.               Where to Get Your Medications        These medications were sent to [id number] - [place], [place] - [address] - [phone]  - [phone] FX  [address] [facility] [address]      Phone: [phone]   benzonatate 100 MG capsule      Gayle Terry, APRN  [address] [facility] [address]  [place] [address] [phone]    On 9/15/2023  Call for appointment       Final diagnoses:   COVID-19   Fever, unspecified fever cause        ED Disposition       ED Disposition   Discharge    Condition   Stable    Comment   --               This medical record created using voice recognition software.             Huber Noriega,   09/12/23  [id number]

## 2023-09-11 NOTE — DISCHARGE INSTRUCTIONS
Please perform strict fever control by alternating Tylenol and Motrin    Please push fluids    Please return to the emergency immediately should you have chest pain, shortness of breath, near passing out, passing out, intractable vomiting, intractable headache, altered mental status or any new symptoms you are concerned with

## 2023-12-26 DIAGNOSIS — E11.9 TYPE 2 DIABETES MELLITUS WITHOUT COMPLICATION, WITHOUT LONG-TERM CURRENT USE OF INSULIN: ICD-10-CM

## 2023-12-26 DIAGNOSIS — E78.2 MIXED HYPERLIPIDEMIA: ICD-10-CM

## 2023-12-26 DIAGNOSIS — I10 PRIMARY HYPERTENSION: ICD-10-CM

## 2023-12-26 RX ORDER — METFORMIN HYDROCHLORIDE 500 MG/1
500 TABLET, EXTENDED RELEASE ORAL
Qty: 90 TABLET | Refills: 1 | Status: SHIPPED | OUTPATIENT
Start: 2023-12-26

## 2023-12-26 RX ORDER — ATORVASTATIN CALCIUM 10 MG/1
10 TABLET, FILM COATED ORAL DAILY
Qty: 90 TABLET | Refills: 1 | Status: SHIPPED | OUTPATIENT
Start: 2023-12-26

## 2023-12-26 RX ORDER — LISINOPRIL 40 MG/1
40 TABLET ORAL DAILY
Qty: 90 TABLET | Refills: 1 | Status: SHIPPED | OUTPATIENT
Start: 2023-12-26

## 2023-12-26 RX ORDER — HYDROCHLOROTHIAZIDE 25 MG/1
25 TABLET ORAL DAILY
Qty: 90 TABLET | Refills: 1 | Status: SHIPPED | OUTPATIENT
Start: 2023-12-26

## 2024-01-16 ENCOUNTER — LAB (OUTPATIENT)
Dept: LAB | Facility: HOSPITAL | Age: 64
End: 2024-01-16
Payer: MEDICARE

## 2024-01-16 ENCOUNTER — OFFICE VISIT (OUTPATIENT)
Dept: FAMILY MEDICINE CLINIC | Age: 64
End: 2024-01-16
Payer: MEDICARE

## 2024-01-16 VITALS
HEIGHT: 74 IN | SYSTOLIC BLOOD PRESSURE: 117 MMHG | HEART RATE: 68 BPM | OXYGEN SATURATION: 99 % | DIASTOLIC BLOOD PRESSURE: 66 MMHG | BODY MASS INDEX: 28.23 KG/M2 | WEIGHT: 220 LBS

## 2024-01-16 DIAGNOSIS — Z12.5 PROSTATE CANCER SCREENING: ICD-10-CM

## 2024-01-16 DIAGNOSIS — E11.9 TYPE 2 DIABETES MELLITUS WITHOUT COMPLICATION, WITHOUT LONG-TERM CURRENT USE OF INSULIN: ICD-10-CM

## 2024-01-16 DIAGNOSIS — I10 PRIMARY HYPERTENSION: ICD-10-CM

## 2024-01-16 DIAGNOSIS — E78.2 MIXED HYPERLIPIDEMIA: ICD-10-CM

## 2024-01-16 DIAGNOSIS — D64.9 ANEMIA, UNSPECIFIED TYPE: ICD-10-CM

## 2024-01-16 DIAGNOSIS — R05.1 ACUTE COUGH: ICD-10-CM

## 2024-01-16 DIAGNOSIS — Z00.00 MEDICARE ANNUAL WELLNESS VISIT, INITIAL: Primary | ICD-10-CM

## 2024-01-16 PROBLEM — Z11.59 SCREENING FOR VIRAL DISEASE: Status: RESOLVED | Noted: 2023-06-20 | Resolved: 2024-01-16

## 2024-01-16 LAB
ALBUMIN SERPL-MCNC: 4.6 G/DL (ref 3.5–5.2)
ALBUMIN UR-MCNC: 1.7 MG/DL
ALBUMIN/GLOB SERPL: 2 G/DL
ALP SERPL-CCNC: 65 U/L (ref 39–117)
ALT SERPL W P-5'-P-CCNC: 26 U/L (ref 1–41)
ANION GAP SERPL CALCULATED.3IONS-SCNC: 12 MMOL/L (ref 5–15)
AST SERPL-CCNC: 18 U/L (ref 1–40)
BASOPHILS # BLD AUTO: 0.04 10*3/MM3 (ref 0–0.2)
BASOPHILS NFR BLD AUTO: 0.5 % (ref 0–1.5)
BILIRUB SERPL-MCNC: 0.5 MG/DL (ref 0–1.2)
BUN SERPL-MCNC: 19 MG/DL (ref 8–23)
BUN/CREAT SERPL: 17 (ref 7–25)
CALCIUM SPEC-SCNC: 10.1 MG/DL (ref 8.6–10.5)
CHLORIDE SERPL-SCNC: 101 MMOL/L (ref 98–107)
CHOLEST SERPL-MCNC: 141 MG/DL (ref 0–200)
CO2 SERPL-SCNC: 25 MMOL/L (ref 22–29)
CREAT SERPL-MCNC: 1.12 MG/DL (ref 0.76–1.27)
CREAT UR-MCNC: 155.3 MG/DL
DEPRECATED RDW RBC AUTO: 41.9 FL (ref 37–54)
EGFRCR SERPLBLD CKD-EPI 2021: 72.9 ML/MIN/1.73
EOSINOPHIL # BLD AUTO: 0.08 10*3/MM3 (ref 0–0.4)
EOSINOPHIL NFR BLD AUTO: 1 % (ref 0.3–6.2)
ERYTHROCYTE [DISTWIDTH] IN BLOOD BY AUTOMATED COUNT: 12.1 % (ref 12.3–15.4)
GLOBULIN UR ELPH-MCNC: 2.3 GM/DL
GLUCOSE SERPL-MCNC: 118 MG/DL (ref 65–99)
HBA1C MFR BLD: 6.7 % (ref 4.8–5.6)
HCT VFR BLD AUTO: 44.3 % (ref 37.5–51)
HDLC SERPL-MCNC: 32 MG/DL (ref 40–60)
HGB BLD-MCNC: 15 G/DL (ref 13–17.7)
IMM GRANULOCYTES # BLD AUTO: 0.13 10*3/MM3 (ref 0–0.05)
IMM GRANULOCYTES NFR BLD AUTO: 1.7 % (ref 0–0.5)
IRON 24H UR-MRATE: 160 MCG/DL (ref 59–158)
IRON SATN MFR SERPL: 44 % (ref 20–50)
LDLC SERPL CALC-MCNC: 74 MG/DL (ref 0–100)
LDLC/HDLC SERPL: 2.09 {RATIO}
LYMPHOCYTES # BLD AUTO: 2.24 10*3/MM3 (ref 0.7–3.1)
LYMPHOCYTES NFR BLD AUTO: 28.8 % (ref 19.6–45.3)
MCH RBC QN AUTO: 31.2 PG (ref 26.6–33)
MCHC RBC AUTO-ENTMCNC: 33.9 G/DL (ref 31.5–35.7)
MCV RBC AUTO: 92.1 FL (ref 79–97)
MICROALBUMIN/CREAT UR: 10.9 MG/G (ref 0–29)
MONOCYTES # BLD AUTO: 0.67 10*3/MM3 (ref 0.1–0.9)
MONOCYTES NFR BLD AUTO: 8.6 % (ref 5–12)
NEUTROPHILS NFR BLD AUTO: 4.62 10*3/MM3 (ref 1.7–7)
NEUTROPHILS NFR BLD AUTO: 59.4 % (ref 42.7–76)
PLATELET # BLD AUTO: 310 10*3/MM3 (ref 140–450)
PMV BLD AUTO: 9.2 FL (ref 6–12)
POTASSIUM SERPL-SCNC: 4.6 MMOL/L (ref 3.5–5.2)
PROT SERPL-MCNC: 6.9 G/DL (ref 6–8.5)
PSA SERPL-MCNC: 2.34 NG/ML (ref 0–4)
RBC # BLD AUTO: 4.81 10*6/MM3 (ref 4.14–5.8)
SODIUM SERPL-SCNC: 138 MMOL/L (ref 136–145)
TIBC SERPL-MCNC: 362 MCG/DL (ref 298–536)
TRANSFERRIN SERPL-MCNC: 243 MG/DL (ref 200–360)
TRIGL SERPL-MCNC: 210 MG/DL (ref 0–150)
VLDLC SERPL-MCNC: 35 MG/DL (ref 5–40)
WBC NRBC COR # BLD AUTO: 7.78 10*3/MM3 (ref 3.4–10.8)

## 2024-01-16 PROCEDURE — 3078F DIAST BP <80 MM HG: CPT | Performed by: NURSE PRACTITIONER

## 2024-01-16 PROCEDURE — 83540 ASSAY OF IRON: CPT

## 2024-01-16 PROCEDURE — 80061 LIPID PANEL: CPT

## 2024-01-16 PROCEDURE — 82570 ASSAY OF URINE CREATININE: CPT

## 2024-01-16 PROCEDURE — 85025 COMPLETE CBC W/AUTO DIFF WBC: CPT

## 2024-01-16 PROCEDURE — 82043 UR ALBUMIN QUANTITATIVE: CPT

## 2024-01-16 PROCEDURE — 1159F MED LIST DOCD IN RCRD: CPT | Performed by: NURSE PRACTITIONER

## 2024-01-16 PROCEDURE — 36415 COLL VENOUS BLD VENIPUNCTURE: CPT

## 2024-01-16 PROCEDURE — 3074F SYST BP LT 130 MM HG: CPT | Performed by: NURSE PRACTITIONER

## 2024-01-16 PROCEDURE — 83036 HEMOGLOBIN GLYCOSYLATED A1C: CPT

## 2024-01-16 PROCEDURE — 84466 ASSAY OF TRANSFERRIN: CPT

## 2024-01-16 PROCEDURE — G0103 PSA SCREENING: HCPCS

## 2024-01-16 PROCEDURE — 1170F FXNL STATUS ASSESSED: CPT | Performed by: NURSE PRACTITIONER

## 2024-01-16 PROCEDURE — 3044F HG A1C LEVEL LT 7.0%: CPT | Performed by: NURSE PRACTITIONER

## 2024-01-16 PROCEDURE — 1160F RVW MEDS BY RX/DR IN RCRD: CPT | Performed by: NURSE PRACTITIONER

## 2024-01-16 PROCEDURE — G0402 INITIAL PREVENTIVE EXAM: HCPCS | Performed by: NURSE PRACTITIONER

## 2024-01-16 PROCEDURE — 80053 COMPREHEN METABOLIC PANEL: CPT

## 2024-01-16 RX ORDER — HYDROCHLOROTHIAZIDE 25 MG/1
25 TABLET ORAL DAILY
Qty: 90 TABLET | Refills: 1 | Status: SHIPPED | OUTPATIENT
Start: 2024-01-16

## 2024-01-16 RX ORDER — ALBUTEROL SULFATE 90 UG/1
2 AEROSOL, METERED RESPIRATORY (INHALATION) EVERY 4 HOURS PRN
Qty: 8 G | Refills: 0 | Status: SHIPPED | OUTPATIENT
Start: 2024-01-16

## 2024-01-16 RX ORDER — METFORMIN HYDROCHLORIDE 500 MG/1
500 TABLET, EXTENDED RELEASE ORAL
Qty: 90 TABLET | Refills: 1 | Status: SHIPPED | OUTPATIENT
Start: 2024-01-16

## 2024-01-16 RX ORDER — OMEPRAZOLE 40 MG/1
40 CAPSULE, DELAYED RELEASE ORAL DAILY
Qty: 30 CAPSULE | Refills: 0 | Status: SHIPPED | OUTPATIENT
Start: 2024-01-16

## 2024-01-16 RX ORDER — LISINOPRIL 40 MG/1
40 TABLET ORAL DAILY
Qty: 90 TABLET | Refills: 1 | Status: SHIPPED | OUTPATIENT
Start: 2024-01-16

## 2024-01-16 RX ORDER — ATORVASTATIN CALCIUM 10 MG/1
10 TABLET, FILM COATED ORAL DAILY
Qty: 90 TABLET | Refills: 1 | Status: SHIPPED | OUTPATIENT
Start: 2024-01-16

## 2024-01-16 NOTE — PROGRESS NOTES
Subsequent Medicare Wellness Visit  The ABC's of Medicare Wellness Visit  Chief Complaint   Patient presents with    Medicare Wellness-Initial Visit    Hypertension     6 month follow up     Hyperlipidemia    Diabetes       Subjective   History of Present Illness      Venu is a 63 y.o. male who presents   for a Subsequent Medicare Wellness Visit.    Patient had COVID in September but symptoms resolved and was only mild case.  He did start with a cough again about a week ago and he had 2 to 3 days of a temperature elevation of 100 to 101 degrees max.  Only symptom that persists is a dry cough.  He does feel like he has wheezed occasionally.  Denies postnasal drip or any mucus production, abdominal pain, dysuria, or any other illness related symptoms.  Wife thinks he may have some acid reflux.  He has tried over-the-counter Prilosec at a couple of 14-day intervals the last which was 2 months ago.    Patient has smoked more than 100 cigarettes in his lifetime.  Stopped smoking in 1986.  Defers abdominal aortic aneurysm at this time but will consider.  Denies any difficulty with starting or stopping his urinary stream.  Will consider Prevnar 20 vaccine.  States he has received a tetanus vaccine booster within the last 10 years.    Does the patient have evidence of cognitive impairment?   No    Aspirin use counseling:Does not need ASA (and currently is not on it)    Recent Hospitalizations:  No hospitalization(s) within the last year.    Advanced Care Planning:  ACP discussion was held with the patient during this visit. Patient does not have an advance directive, declines further assistance.    The following portions of the patient's history were reviewed   and updated as appropriate: allergies, current medications, past family history, past medical history, past social history, past surgical history, and problem list.    Compared to one year ago, the patient feels his   physical health is better.  Compared to one  year ago, the patient feels his   mental health is better.    Reviewed chart for potential of high risk medication in the elderly:  yes  Reviewed chart for potential of harmful drug interactions in the elderly:  yes            HEALTH RISK ASSESSMENT BEGIN  Fall Risk Screen:  PEDRO Fall Risk Assessment was completed, and patient is at LOW risk for falls.Assessment completed on:2024    Depression Screen:       2024     9:08 AM   PHQ-2/PHQ-9 Depression Screening   Little Interest or Pleasure in Doing Things 0-->not at all   Feeling Down, Depressed or Hopeless 0-->not at all   PHQ-9: Brief Depression Severity Measure Score 0       Current Medical Providers:  Patient Care Team:  Gayle Terry APRN as PCP - General (Nurse Practitioner)  Gustabo Walton OD (Optometry)    Smoking Status:  Social History     Tobacco Use   Smoking Status Former    Packs/day: 1.00    Years: 18.00    Additional pack years: 0.00    Total pack years: 18.00    Types: Cigarettes    Quit date: 1986    Years since quittin.0   Smokeless Tobacco Never   Tobacco Comments    Started at age 7       Alcohol Consumption:  Social History     Substance and Sexual Activity   Alcohol Use Yes    Alcohol/week: 1.0 standard drink of alcohol    Types: 1 Glasses of wine per week    Comment: [facility]       Health Habits and Functional and Cognitive Screenin/16/2024     9:09 AM   Functional & Cognitive Status   Do you have difficulty preparing food and eating? No   Do you have difficulty bathing yourself, getting dressed or grooming yourself? No   Do you have difficulty using the toilet? No   Do you have difficulty moving around from place to place? No   Do you have trouble with steps or getting out of a bed or a chair? No   Current Diet Diabetic Diet   Dental Exam Up to date   Eye Exam Up to date        Eye Exam Comment [facility]   Exercise (times per week) 0 times per week   Current Exercises Include No Regular Exercise   Do  you need help using the phone?  No   Are you deaf or do you have serious difficulty hearing?  No   Do you need help to go to places out of walking distance? No   Do you need help shopping? No   Do you need help preparing meals?  No   Do you need help with housework?  No   Do you need help with laundry? No   Do you need help taking your medications? No   Do you need help managing money? No   Do you ever drive or ride in a car without wearing a seat belt? No   Have you felt unusual stress, anger or loneliness in the last month? No   Who do you live with? Spouse   If you need help, do you have trouble finding someone available to you? No   Have you been bothered in the last four weeks by sexual problems? No   Do you have difficulty concentrating, remembering or making decisions? No       Age-appropriate Screening Schedule:  Refer to the list below for future screening recommendations based on patient's age, sex and/or medical conditions. Orders for these recommended tests are listed in the plan section. The patient has been provided with a written plan.    Health Maintenance   Topic Date Due    Pneumococcal Vaccine 65+ (1 of 2 - PCV) Never done    TDAP/TD VACCINES (1 - Tdap) Never done    AAA SCREEN (ONE-TIME)  Never done    COVID-19 Vaccine (3 - 2023-24 season) 01/16/2025 (Originally 9/1/2023)    DIABETIC FOOT EXAM  06/20/2024    LIPID PANEL  06/20/2024    BMI FOLLOWUP  07/11/2024    HEMOGLOBIN A1C  07/16/2024    DIABETIC EYE EXAM  08/04/2024    ANNUAL WELLNESS VISIT  01/16/2025    URINE MICROALBUMIN  01/16/2025    COLORECTAL CANCER SCREENING  02/28/2032    HEPATITIS C SCREENING  Completed    INFLUENZA VACCINE  Completed    ZOSTER VACCINE  Completed     HEALTH RISK ASSESSMENT END    Outpatient Medications Prior to Visit   Medication Sig Dispense Refill    ferrous sulfate 325 (65 Fe) MG tablet Take 1 tablet by mouth Daily. HOLD TIL AFTER COLONOSCOPY      multivitamin with minerals tablet tablet Take 1 tablet by mouth  "Daily.      atorvastatin (LIPITOR) 10 MG tablet Take 1 tablet by mouth Daily. 90 tablet 1    hydroCHLOROthiazide (HYDRODIURIL) 25 MG tablet Take 1 tablet by mouth Daily. 90 tablet 1    lisinopril (PRINIVIL,ZESTRIL) 40 MG tablet Take 1 tablet by mouth Daily. 90 tablet 1    metFORMIN ER (GLUCOPHAGE-XR) 500 MG 24 hr tablet Take 1 tablet by mouth Daily With Dinner. 90 tablet 1    baclofen (LIORESAL) 10 MG tablet Take 1 tablet by mouth 3 (Three) Times a Day As Needed for Muscle Spasms. May cause drowsiness 40 tablet 0    diclofenac (VOLTAREN) 50 MG EC tablet Take 1 tablet by mouth 2 (Two) Times a Day As Needed (pain). 60 tablet 0    fluticasone (FLONASE) 50 MCG/ACT nasal spray 2 sprays into the nostril(s) as directed by provider As Needed.       No facility-administered medications prior to visit.       Patient Active Problem List   Diagnosis    HTN (hypertension)    Hyperlipidemia    Type 2 diabetes mellitus without complication, without long-term current use of insulin    Hordeolum internum of right upper eyelid    Other fatigue    History of anemia    Anemia    Acute cough    Bronchitis    Acute bilateral low back pain with bilateral sciatica    Medicare annual wellness visit, initial            Objective      Vitals:    01/16/24 0901   BP: 117/66   BP Location: Left arm   Patient Position: Sitting   Cuff Size: Adult   Pulse: 68   SpO2: 99%   Weight: 99.8 kg (220 lb)   Height: 188 cm (74\")   PainSc:   1   PainLoc: Generalized       Physical Exam        Result Review :     Lab Results   Component Value Date    HGBA1C 6.70 (H) 01/16/2024        Lab Results - Last 18 Months   Lab Units 01/16/24  1002 06/20/23  0834 12/20/22  0947 12/20/22  0940   BUN mg/dL  --  24*  --  18   CREATININE mg/dL  --  1.04  --  0.99   SODIUM mmol/L  --  138  --  142   POTASSIUM mmol/L  --  4.6  --  4.7   CHLORIDE mmol/L  --  104  --  106   CALCIUM mg/dL  --  9.5  --  9.6   ALBUMIN g/dL  --  4.5  --  4.30   BILIRUBIN mg/dL  --  0.5  --  0.4 "   ALK PHOS U/L  --  65  --  70   AST (SGOT) U/L  --  21  --  15   ALT (SGPT) U/L  --  24  --  18   TRIGLYCERIDES mg/dL  --  239*  --  96   HDL CHOL mg/dL  --  35*  --  38*   VLDL CHOL mg/dL  --  40  --  18   LDL CHOL mg/dL  --  91  --  86   LDL/HDL RATIO   --  2.38  --  2.23   HEMOGLOBIN A1C % 6.70* 6.50*  --  6.60*   MICROALB UR mg/dL 1.7  --  3.0  --    WBC 10*3/mm3 7.78 6.66  --  12.91*   RBC 10*6/mm3 4.81 4.58  --  5.10   HEMATOCRIT % 44.3 42.8  --  46.7   MCV fL 92.1 93.4  --  91.6   MCH pg 31.2 31.4  --  30.6   PSA ng/mL  --   --   --  1.350       The following data was reviewed by: MARY Mora on 01/16/2024:    Assessment & Plan   Assessment and Plan      Diagnoses and all orders for this visit:    1. Medicare annual wellness visit, initial (Primary)  Assessment & Plan:  Preventive care measures are discussed.  Further treatment pending lab results.      2. Type 2 diabetes mellitus without complication, without long-term current use of insulin  -     Comprehensive metabolic panel; Future  -     Hemoglobin A1c; Future  -     Lipid panel; Future  -     Microalbumin / Creatinine Urine Ratio - Urine, Clean Catch; Future  -     metFORMIN ER (GLUCOPHAGE-XR) 500 MG 24 hr tablet; Take 1 tablet by mouth Daily With Dinner.  Dispense: 90 tablet; Refill: 1    3. Mixed hyperlipidemia  -     Comprehensive metabolic panel; Future  -     Lipid panel; Future  -     atorvastatin (LIPITOR) 10 MG tablet; Take 1 tablet by mouth Daily.  Dispense: 90 tablet; Refill: 1    4. Primary hypertension  -     hydroCHLOROthiazide (HYDRODIURIL) 25 MG tablet; Take 1 tablet by mouth Daily.  Dispense: 90 tablet; Refill: 1  -     lisinopril (PRINIVIL,ZESTRIL) 40 MG tablet; Take 1 tablet by mouth Daily.  Dispense: 90 tablet; Refill: 1    5. Anemia, unspecified type  -     CBC w AUTO Differential; Future  -     Iron and TIBC; Future    6. Acute cough  -     omeprazole (priLOSEC) 40 MG capsule; Take 1 capsule by mouth Daily.  Dispense:  30 capsule; Refill: 0  -     albuterol sulfate  (90 Base) MCG/ACT inhaler; Inhale 2 puffs Every 4 (Four) Hours As Needed for Wheezing or Shortness of Air.  Dispense: 8 g; Refill: 0    7. Prostate cancer screening  -     PSA SCREENING; Future        Medicare Risks and Personalized Health Plan  CMS Preventative Services Quick Reference  Abdominal Aortic Aneurysm Screening  Advance Directive Discussion  Immunizations Discussed/Encouraged (specific immunizations; Tdap and Prevnar 20 (Pneumococcal 20-valent conjugate) )  Prostate Cancer Screening     The above risks/problems have been discussed with the patient.  Pertinent information has been shared with the patient in the   After Visit Summary. Follow up plans and orders are seen below   in the Assessment/Plan Section.    I spent 45 minutes caring for Meek on this date of service. This time includes time spent by me in the following activities:preparing for the visit, reviewing tests, obtaining and/or reviewing a separately obtained history, performing a medically appropriate examination and/or evaluation , counseling and educating the patient/family/caregiver, ordering medications, tests, or procedures, referring and communicating with other health care professionals , and independently interpreting results and communicating that information with the patient/family/caregiver    Follow Up   No follow-ups on file.     An After Visit Summary and PPPS were given to the patient.

## 2024-01-23 NOTE — PROGRESS NOTES
Diabetes is controlled. Kidney and liver function are normal.  Hemoglobin and hematocrit are normal.  Iron level is slightly elevated.  I recommend taking iron every other day instead of every day.

## 2024-07-16 ENCOUNTER — OFFICE VISIT (OUTPATIENT)
Dept: FAMILY MEDICINE CLINIC | Age: 64
End: 2024-07-16
Payer: MEDICARE

## 2024-07-16 ENCOUNTER — LAB (OUTPATIENT)
Dept: LAB | Facility: HOSPITAL | Age: 64
End: 2024-07-16
Payer: MEDICARE

## 2024-07-16 VITALS
WEIGHT: 221.8 LBS | BODY MASS INDEX: 28.47 KG/M2 | OXYGEN SATURATION: 96 % | HEIGHT: 74 IN | DIASTOLIC BLOOD PRESSURE: 67 MMHG | SYSTOLIC BLOOD PRESSURE: 125 MMHG | HEART RATE: 52 BPM

## 2024-07-16 DIAGNOSIS — E78.2 MIXED HYPERLIPIDEMIA: ICD-10-CM

## 2024-07-16 DIAGNOSIS — R07.9 CHEST PAIN, UNSPECIFIED TYPE: ICD-10-CM

## 2024-07-16 DIAGNOSIS — K21.9 GASTROESOPHAGEAL REFLUX DISEASE, UNSPECIFIED WHETHER ESOPHAGITIS PRESENT: ICD-10-CM

## 2024-07-16 DIAGNOSIS — E11.9 TYPE 2 DIABETES MELLITUS WITHOUT COMPLICATION, WITHOUT LONG-TERM CURRENT USE OF INSULIN: Primary | ICD-10-CM

## 2024-07-16 DIAGNOSIS — I10 PRIMARY HYPERTENSION: ICD-10-CM

## 2024-07-16 DIAGNOSIS — R53.83 OTHER FATIGUE: ICD-10-CM

## 2024-07-16 DIAGNOSIS — R06.00 DYSPNEA, UNSPECIFIED TYPE: ICD-10-CM

## 2024-07-16 DIAGNOSIS — E11.9 TYPE 2 DIABETES MELLITUS WITHOUT COMPLICATION, WITHOUT LONG-TERM CURRENT USE OF INSULIN: ICD-10-CM

## 2024-07-16 LAB
ALBUMIN SERPL-MCNC: 4.2 G/DL (ref 3.5–5.2)
ALBUMIN/GLOB SERPL: 1.8 G/DL
ALP SERPL-CCNC: 63 U/L (ref 39–117)
ALT SERPL W P-5'-P-CCNC: 18 U/L (ref 1–41)
ANION GAP SERPL CALCULATED.3IONS-SCNC: 9 MMOL/L (ref 5–15)
AST SERPL-CCNC: 15 U/L (ref 1–40)
BILIRUB SERPL-MCNC: 0.3 MG/DL (ref 0–1.2)
BUN SERPL-MCNC: 33 MG/DL (ref 8–23)
BUN/CREAT SERPL: 27.3 (ref 7–25)
CALCIUM SPEC-SCNC: 9.6 MG/DL (ref 8.6–10.5)
CHLORIDE SERPL-SCNC: 104 MMOL/L (ref 98–107)
CHOLEST SERPL-MCNC: 148 MG/DL (ref 0–200)
CO2 SERPL-SCNC: 25 MMOL/L (ref 22–29)
CREAT SERPL-MCNC: 1.21 MG/DL (ref 0.76–1.27)
EGFRCR SERPLBLD CKD-EPI 2021: 66 ML/MIN/1.73
GLOBULIN UR ELPH-MCNC: 2.4 GM/DL
GLUCOSE SERPL-MCNC: 124 MG/DL (ref 65–99)
HBA1C MFR BLD: 6.3 % (ref 4.8–5.6)
HDLC SERPL-MCNC: 34 MG/DL (ref 40–60)
LDLC SERPL CALC-MCNC: 88 MG/DL (ref 0–100)
LDLC/HDLC SERPL: 2.48 {RATIO}
POTASSIUM SERPL-SCNC: 5.1 MMOL/L (ref 3.5–5.2)
PROT SERPL-MCNC: 6.6 G/DL (ref 6–8.5)
SODIUM SERPL-SCNC: 138 MMOL/L (ref 136–145)
TRIGL SERPL-MCNC: 149 MG/DL (ref 0–150)
VLDLC SERPL-MCNC: 26 MG/DL (ref 5–40)

## 2024-07-16 PROCEDURE — 3078F DIAST BP <80 MM HG: CPT | Performed by: NURSE PRACTITIONER

## 2024-07-16 PROCEDURE — 3074F SYST BP LT 130 MM HG: CPT | Performed by: NURSE PRACTITIONER

## 2024-07-16 PROCEDURE — 93005 ELECTROCARDIOGRAM TRACING: CPT | Performed by: NURSE PRACTITIONER

## 2024-07-16 PROCEDURE — 1125F AMNT PAIN NOTED PAIN PRSNT: CPT | Performed by: NURSE PRACTITIONER

## 2024-07-16 PROCEDURE — 99214 OFFICE O/P EST MOD 30 MIN: CPT | Performed by: NURSE PRACTITIONER

## 2024-07-16 PROCEDURE — 36415 COLL VENOUS BLD VENIPUNCTURE: CPT

## 2024-07-16 PROCEDURE — 80053 COMPREHEN METABOLIC PANEL: CPT

## 2024-07-16 PROCEDURE — 1160F RVW MEDS BY RX/DR IN RCRD: CPT | Performed by: NURSE PRACTITIONER

## 2024-07-16 PROCEDURE — 83036 HEMOGLOBIN GLYCOSYLATED A1C: CPT

## 2024-07-16 PROCEDURE — 3044F HG A1C LEVEL LT 7.0%: CPT | Performed by: NURSE PRACTITIONER

## 2024-07-16 PROCEDURE — 1159F MED LIST DOCD IN RCRD: CPT | Performed by: NURSE PRACTITIONER

## 2024-07-16 PROCEDURE — 80061 LIPID PANEL: CPT

## 2024-07-16 RX ORDER — LISINOPRIL 40 MG/1
40 TABLET ORAL DAILY
Qty: 90 TABLET | Refills: 1 | Status: SHIPPED | OUTPATIENT
Start: 2024-07-16

## 2024-07-16 RX ORDER — HYDROCHLOROTHIAZIDE 25 MG/1
25 TABLET ORAL DAILY
Qty: 90 TABLET | Refills: 1 | Status: SHIPPED | OUTPATIENT
Start: 2024-07-16

## 2024-07-16 RX ORDER — METFORMIN HYDROCHLORIDE 500 MG/1
500 TABLET, EXTENDED RELEASE ORAL
Qty: 90 TABLET | Refills: 1 | Status: SHIPPED | OUTPATIENT
Start: 2024-07-16

## 2024-07-16 RX ORDER — ATORVASTATIN CALCIUM 10 MG/1
10 TABLET, FILM COATED ORAL DAILY
Qty: 90 TABLET | Refills: 1 | Status: SHIPPED | OUTPATIENT
Start: 2024-07-16

## 2024-07-16 NOTE — ASSESSMENT & PLAN NOTE
EKG consistent with previous EKG.  Will proceed with sleep study, CT imaging of the chest, and referral for an upper scope potentially.  Lab results are pending.  Further treatment recommendations pending lab results

## 2024-07-16 NOTE — PROGRESS NOTES
"Chief Complaint  Hypertension (6 month f/u. )    Subjective          Meek Quiros presents to [facility]     Patient is a 64-year-old male who is here today with his wife.  Follow-up regarding fatigue since having COVID in December 2020.  Denies any specific sleep problems.  Does fatigue very easily.  Will start to feel a burning in his chest and patient has had some relief with taking over-the-counter Prilosec after a 1 month course of omeprazole 40 mg daily earlier this year.  He has had some chronic cough which is improved.  He denies wheezing, irregular heartbeat, loss of consciousness, or orthopnea.  Chest x-ray last done September 2023 and negative.  He has not had a sleep study.  Labs as recently as January indicate no iron deficiency anemia.  He has had normal thyroid function testing and testosterone levels previously.  Vitamin B12 also previously normal.  With fatigue episodes he states that the color white looks more vibrant and looking at white objects causes some photophobia.  He denies headaches, unilateral weakness or numbness, or slurred speech.  Wife has noticed a mild tremor of his hands when he raises his hands.  Denies resting tremor or tremor while he is holding an object.    Reports blood pressure under good control on lisinopril 40 mg daily and hydrochlorothiazide 25 mg daily.  Denies side effects and requests refills.    Regarding type 2 diabetes he takes metformin extended release 500 mg once daily with a meal.  His last hemoglobin A1c was 6.7%.  Denies side effects and requests refills.    For hyperlipidemia he takes atorvastatin 10 mg daily.  Denies side effects and requests refills.     Objective   Vital Signs:   Vitals:    07/16/24 0847   BP: 125/67   BP Location: Right arm   Patient Position: Sitting   Cuff Size: Large Adult   Pulse: 52   SpO2: 96%   Weight: 101 kg (221 lb 12.8 oz)   Height: 188 cm (74.02\")       Wt Readings from Last 3 Encounters: "   07/16/24 101 kg (221 lb 12.8 oz)   01/16/24 99.8 kg (220 lb)   09/10/23 103 kg (227 lb 15.3 oz)      BP Readings from Last 3 Encounters:   07/16/24 125/67   01/16/24 117/66   09/11/23 165/65       Body mass index is 28.47 kg/m².             Physical Exam  Vitals reviewed.   Constitutional:       General: He is not in acute distress.     Appearance: Normal appearance. He is well-developed.   Cardiovascular:      Rate and Rhythm: Normal rate and regular rhythm.      Pulses:           Dorsalis pedis pulses are 2+ on the right side and 2+ on the left side.        Posterior tibial pulses are 2+ on the right side and 2+ on the left side.      Heart sounds: Normal heart sounds.   Pulmonary:      Effort: Pulmonary effort is normal.      Breath sounds: Normal breath sounds.   Musculoskeletal:      Right lower leg: No edema.      Left lower leg: No edema.   Feet:      Right foot:      Protective Sensation: 3 sites tested.  3 sites sensed.      Skin integrity: Skin integrity normal. No ulcer or blister.      Toenail Condition: Right toenails are normal.      Left foot:      Protective Sensation: 3 sites tested.  3 sites sensed.      Skin integrity: Skin integrity normal. No ulcer or blister.      Toenail Condition: Left toenails are normal.      Comments:      Skin:     General: Skin is warm and dry.   Neurological:      General: No focal deficit present.      Mental Status: He is alert.   Psychiatric:         Attention and Perception: Attention normal.         Mood and Affect: Mood and affect normal.         Behavior: Behavior normal.           Current Outpatient Medications:     albuterol sulfate  (90 Base) MCG/ACT inhaler, Inhale 2 puffs Every 4 (Four) Hours As Needed for Wheezing or Shortness of Air., Disp: 8 g, Rfl: 0    atorvastatin (LIPITOR) 10 MG tablet, Take 1 tablet by mouth Daily., Disp: 90 tablet, Rfl: 1    ferrous sulfate 325 (65 Fe) MG tablet, Take 1 tablet by mouth Every Other Day., Disp: , Rfl:      hydroCHLOROthiazide 25 MG tablet, Take 1 tablet by mouth Daily., Disp: 90 tablet, Rfl: 1    lisinopril (PRINIVIL,ZESTRIL) 40 MG tablet, Take 1 tablet by mouth Daily., Disp: 90 tablet, Rfl: 1    metFORMIN ER (GLUCOPHAGE-XR) 500 MG 24 hr tablet, Take 1 tablet by mouth Daily With Dinner., Disp: 90 tablet, Rfl: 1    multivitamin with minerals tablet tablet, Take 1 tablet by mouth Daily., Disp: , Rfl:     omeprazole (priLOSEC) 40 MG capsule, Take 1 capsule by mouth Daily., Disp: 30 capsule, Rfl: 0   Past Medical History:   Diagnosis Date    Anemia 2023    Cancer Melanoma    Colon polyp long time ago    Diabetes mellitus 2020    Hyperlipidemia     Hypertension Long time ago     No Known Allergies            Result Review :     Common labs          2024    10:02   Common Labs   Glucose 118    BUN 19    Creatinine 1.12    Sodium 138    Potassium 4.6    Chloride 101    Calcium 10.1    Albumin 4.6    Total Bilirubin 0.5    Alkaline Phosphatase 65    AST (SGOT) 18    ALT (SGPT) 26    WBC 7.78    Hemoglobin 15.0    Hematocrit 44.3    Platelets 310    Total Cholesterol 141    Triglycerides 210    HDL Cholesterol 32    LDL Cholesterol  74    Hemoglobin A1C 6.70    Microalbumin, Urine 1.7    PSA 2.340         No Images in the past 120 days found..         ECG 12 Lead    Date/Time: 2024 1:44 PM  Performed by: Gayle Terry APRN    Authorized by: Gayle Terry APRN  Comparison: compared with previous ECG from 11/10/2021  Similar to previous ECG  Rhythm: sinus rhythm  Rate: normal  BPM: 61  ST Flattening: II    Clinical impression: non-specific ECG  Comments: Ectopic atrial rhythm.  Unchanged from previous EKG.         Social History     Tobacco Use   Smoking Status Former    Current packs/day: 0.00    Average packs/day: 1 pack/day for 18.0 years (18.0 ttl pk-yrs)    Types: Cigarettes    Start date: 1968    Quit date: 1986    Years since quittin.5   Smokeless Tobacco Never   Tobacco Comments     Started at age 7           Assessment and Plan    Diagnoses and all orders for this visit:    1. Type 2 diabetes mellitus without complication, without long-term current use of insulin (Primary)  -     Comprehensive metabolic panel; Future  -     Hemoglobin A1c; Future  -     Lipid panel; Future  -     metFORMIN ER (GLUCOPHAGE-XR) 500 MG 24 hr tablet; Take 1 tablet by mouth Daily With Dinner.  Dispense: 90 tablet; Refill: 1    2. Chest pain, unspecified type  Assessment & Plan:  EKG consistent with previous EKG.  Will proceed with sleep study, CT imaging of the chest, and referral for an upper scope potentially.  Lab results are pending.  Further treatment recommendations pending lab results    Orders:  -     Ambulatory Referral to General Surgery  -     ECG 12 Lead  -     CT Chest With Contrast; Future    3. Gastroesophageal reflux disease, unspecified whether esophagitis present  -     Ambulatory Referral to General Surgery    4. Other fatigue  -     Ambulatory Referral to Sleep Medicine    5. Primary hypertension  -     lisinopril (PRINIVIL,ZESTRIL) 40 MG tablet; Take 1 tablet by mouth Daily.  Dispense: 90 tablet; Refill: 1  -     hydroCHLOROthiazide 25 MG tablet; Take 1 tablet by mouth Daily.  Dispense: 90 tablet; Refill: 1    6. Mixed hyperlipidemia  -     atorvastatin (LIPITOR) 10 MG tablet; Take 1 tablet by mouth Daily.  Dispense: 90 tablet; Refill: 1    7. Dyspnea, unspecified type  -     CT Chest With Contrast; Future        Follow Up    Return if symptoms worsen or fail to improve.  Patient was given instructions and counseling regarding his condition or for health maintenance advice. Please see specific information pulled into the AVS if appropriate.

## 2024-07-17 NOTE — PROGRESS NOTES
Diabetes is under good control.  Lipid panel looks good overall.  Kidney and liver function are normal.

## 2024-07-24 ENCOUNTER — HOSPITAL ENCOUNTER (OUTPATIENT)
Dept: CT IMAGING | Facility: HOSPITAL | Age: 64
Discharge: HOME | End: 2024-07-24
Admitting: NURSE PRACTITIONER
Payer: MEDICARE

## 2024-07-24 DIAGNOSIS — R06.00 DYSPNEA, UNSPECIFIED TYPE: ICD-10-CM

## 2024-07-24 DIAGNOSIS — R07.9 CHEST PAIN, UNSPECIFIED TYPE: ICD-10-CM

## 2024-07-24 PROCEDURE — 71260 CT THORAX DX C+: CPT

## 2024-07-24 PROCEDURE — 25510000001 IOPAMIDOL PER 1 ML: Performed by: NURSE PRACTITIONER

## 2024-07-24 RX ADMIN — IOPAMIDOL 100 ML: 755 INJECTION, SOLUTION INTRAVENOUS at 16:39

## 2024-07-31 ENCOUNTER — TELEPHONE (OUTPATIENT)
Dept: SURGERY | Facility: CLINIC | Age: 64
End: 2024-07-31
Payer: COMMERCIAL

## 2024-07-31 NOTE — TELEPHONE ENCOUNTER
"Hub staff attempted to follow warm transfer process and was unsuccessful     Caller: Meek Quiros \"Venu\"    Relationship to patient: Self    Best call back number: [phone]     Patient is needing: PT RETURNED CALL TO FRANKIE  "

## 2024-08-20 ENCOUNTER — OFFICE VISIT (OUTPATIENT)
Dept: SLEEP MEDICINE | Facility: HOSPITAL | Age: 64
End: 2024-08-20
Payer: MEDICARE

## 2024-08-20 VITALS — HEIGHT: 74 IN | BODY MASS INDEX: 29.09 KG/M2 | OXYGEN SATURATION: 97 % | WEIGHT: 226.7 LBS | HEART RATE: 62 BPM

## 2024-08-20 DIAGNOSIS — G47.10 HYPERSOMNIA: Primary | ICD-10-CM

## 2024-08-20 DIAGNOSIS — E66.3 OVERWEIGHT WITH BODY MASS INDEX (BMI) 25.0-29.9: ICD-10-CM

## 2024-08-20 DIAGNOSIS — G47.8 NON-RESTORATIVE SLEEP: ICD-10-CM

## 2024-08-20 DIAGNOSIS — U09.9 COVID-19 LONG HAULER: ICD-10-CM

## 2024-08-20 PROCEDURE — 1159F MED LIST DOCD IN RCRD: CPT | Performed by: FAMILY MEDICINE

## 2024-08-20 PROCEDURE — 99204 OFFICE O/P NEW MOD 45 MIN: CPT | Performed by: FAMILY MEDICINE

## 2024-08-20 PROCEDURE — 1160F RVW MEDS BY RX/DR IN RCRD: CPT | Performed by: FAMILY MEDICINE

## 2024-08-20 PROCEDURE — G0463 HOSPITAL OUTPT CLINIC VISIT: HCPCS

## 2024-08-20 NOTE — PROGRESS NOTES
[facility] New [facility]ultation       Reason for Consultation: Fatigue      Patient Care Team:  Gayle Terry APRN as PCP - General (Nurse Practitioner)  Gustabo Walton OD (Optometry)  Laney Wise MD as Consulting Physician (Sleep Medicine)      History of present illness:  Thank you for asking me to see your patient.  The patient is a 64 y.o. male presents due to concern for sleep as ordered.  No history of prior sleep study or tonsillectomy.  Significant fatigue since having COVID-19 infection.  Sleeps 5 to 6 hours sleep latency 10 to 15 minutes 0 naps no rotating shifts.  Reports hypersomnia nonrestorative sleep nocturia up to 3 times a night restless sleep.  BMI 29.1.    Medical Conditions (PMH):   Hypertension  Diabetes  Acid reflux    Social history:  Do you drive a commercial vehicle:  No   Shift work:  No   Tobacco use:  No   Alcohol use: 0-1 per week  Caffeinated drinks: 3 per day  Occupation: Unanswered    Family History (parents and siblings) (pertaining to sleep medicine):  Negative    Allergies:  Patient has no known allergies.       Current Outpatient Medications:     atorvastatin (LIPITOR) 10 MG tablet, Take 1 tablet by mouth Daily., Disp: 90 tablet, Rfl: 1    ferrous sulfate 325 (65 Fe) MG tablet, Take 1 tablet by mouth Every Other Day., Disp: , Rfl:     hydroCHLOROthiazide 25 MG tablet, Take 1 tablet by mouth Daily., Disp: 90 tablet, Rfl: 1    lisinopril (PRINIVIL,ZESTRIL) 40 MG tablet, Take 1 tablet by mouth Daily., Disp: 90 tablet, Rfl: 1    metFORMIN ER (GLUCOPHAGE-XR) 500 MG 24 hr tablet, Take 1 tablet by mouth Daily With Dinner., Disp: 90 tablet, Rfl: 1    multivitamin with minerals tablet tablet, Take 1 tablet by mouth Daily., Disp: , Rfl:     omeprazole (priLOSEC) 40 MG capsule, Take 1 capsule by mouth Daily., Disp: 30 capsule, Rfl: 0    albuterol sulfate  (90 Base) MCG/ACT inhaler, Inhale 2 puffs Every 4 (Four) Hours As Needed for Wheezing or Shortness of  "Air. (Patient not taking: Reported on 8/20/2024), Disp: 8 g, Rfl: 0    Vital Signs:    Vitals:    08/20/24 1100   Pulse: 62   SpO2: 97%   Weight: 103 kg (226 lb 11.2 oz)   Height: 188 cm (74.02\")      Body mass index is 29.09 kg/m².  Neck Circumference: 15.5 inches      REVIEW OF SYSTEMS:  Pertinent positive symptoms are:  [place] Sleepiness Scale of Total score: 6   Fatigue  Frequent urination      Physical exam:  Vitals:    08/20/24 1100   Pulse: 62   SpO2: 97%   Weight: 103 kg (226 lb 11.2 oz)   Height: 188 cm (74.02\")    Body mass index is 29.09 kg/m². Neck Circumference: 15.5 inches  HEENT: Head is atraumatic, normocephalic  Eyes: pupils are round equal and reacting to light and accommodation, conjunctiva normal  Throat: tongue normal  NECK:Neck Circumference: 15.5 inches  RESPIRATORY SYSTEM: Regular respirations  CARDIOVASULAR SYSTEM: Regular rate  EXTREMITES: No cyanosis, clubbing  NEUROLOGICAL SYSTEM: Oriented x 3, no gross motor defects, gait normal      Impression:  1. Hypersomnia    2. Non-restorative sleep    3. Overweight with body mass index (BMI) 25.0-29.9    4. COVID-19 long hauler        Plan:    Office note(s) from care team reviewed. Office note(s) reviewed: 7/16/2024 PCP    Labs/ Test Results Reviewed:     Most Recent A1C          7/16/2024    10:12   HGBA1C Most Recent   Hemoglobin A1C 6.30    A1 is a slightly elevated          ASSESSMENT AND PLAN:   At risk for sleep apnea: patient's symptoms and physical examination are concerning for possible sleep apnea.   I discussed the signs, symptoms, and pathophysiology of sleep apnea with this patient.  I also discussed the potential complications of untreated sleep apnea including but not limited to resistant hypertension, insulin resistance, pulmonary hypertension, atrial fibrillation, heart attack, stroke, nonrestorative sleep with hypersomnia which can increase risk for motor vehicle accidents, etc.   Different testing methods including home-based " and lab based sleep studies were discussed with this patient.   Based on patient history and physical examination, will proceed with HST.  The order for the sleep study is placed in [facility].  The test will be scheduled after prior authorization has been obtained through patient's insurance.  Treatment and management will be discussed in more detail with this patient after the test is completed.  All questions were answered to patient's satisfaction.   Snoring: snoring is the sound created by turbulent airflow vibrating upper airway soft tissue.  I have also discussed factors affecting snoring including sleep deprivation, sleeping on the back and alcohol ingestion. To minimize snoring, patient is advised to have adequate sleep, sleep on their side, and avoid alcohol and sedative medications around bedtime.   Excessive daytime sleepiness:  [place] Sleepiness Scale of Total score: 6.  There are many causes of excessive daytime sleepiness.  Rule out sleep apnea as a contributing factor, as above.  Do not drive, operate heavy machinery, or do activities that require high concentration if feeling tired/drowsy.  Overweight: Body mass index is 29.09 kg/m².. Patients who are overweight or obese are at increased risk of sleep apnea/ sleep disordered breathing. Weight reduction and healthy lifestyle are encouraged in overweight/ obese patients as part of a comprehensive approach to sleep apnea treatment.      I have also discussed with the patient the following  Sleep hygiene: try to maintain a regular bed time and wake time, avoid watching TV/ using electronic devices in bed (including cell phones), limit caffeinated and alcoholic beverages before bed, try to maintain a cool and quiet sleep environment, avoid daytime naps  Adequate amount of sleep: most people need around 7 to 8 hours of sleep each night      Patient will follow-up after study, 31 to 90 days after PAP therapy initiated if applicable, or contact the office sooner  for questions or concerns. Patient's questions were answered.      Thank you for allowing me to participate in your patient's care.    Laney Wise MD  Sleep Medicine  08/20/24  13:31 EDT

## 2024-08-21 ENCOUNTER — OFFICE VISIT (OUTPATIENT)
Dept: SURGERY | Facility: CLINIC | Age: 64
End: 2024-08-21
Payer: MEDICARE

## 2024-08-21 ENCOUNTER — PREP FOR SURGERY (OUTPATIENT)
Dept: OTHER | Facility: HOSPITAL | Age: 64
End: 2024-08-21
Payer: COMMERCIAL

## 2024-08-21 VITALS
BODY MASS INDEX: 29 KG/M2 | HEIGHT: 74 IN | HEART RATE: 49 BPM | SYSTOLIC BLOOD PRESSURE: 141 MMHG | WEIGHT: 226 LBS | DIASTOLIC BLOOD PRESSURE: 92 MMHG

## 2024-08-21 DIAGNOSIS — K21.9 GASTROESOPHAGEAL REFLUX DISEASE WITHOUT ESOPHAGITIS: Primary | ICD-10-CM

## 2024-08-21 DIAGNOSIS — K21.00 GASTROESOPHAGEAL REFLUX DISEASE WITH ESOPHAGITIS WITHOUT HEMORRHAGE: Primary | ICD-10-CM

## 2024-08-21 PROCEDURE — 1159F MED LIST DOCD IN RCRD: CPT | Performed by: NURSE PRACTITIONER

## 2024-08-21 PROCEDURE — 3080F DIAST BP >= 90 MM HG: CPT | Performed by: NURSE PRACTITIONER

## 2024-08-21 PROCEDURE — 3077F SYST BP >= 140 MM HG: CPT | Performed by: NURSE PRACTITIONER

## 2024-08-21 PROCEDURE — 99213 OFFICE O/P EST LOW 20 MIN: CPT | Performed by: NURSE PRACTITIONER

## 2024-08-21 PROCEDURE — 1160F RVW MEDS BY RX/DR IN RCRD: CPT | Performed by: NURSE PRACTITIONER

## 2024-08-21 RX ORDER — SODIUM CHLORIDE 0.9 % (FLUSH) 0.9 %
10 SYRINGE (ML) INJECTION AS NEEDED
OUTPATIENT
Start: 2024-08-21

## 2024-08-21 RX ORDER — SODIUM CHLORIDE 9 MG/ML
40 INJECTION, SOLUTION INTRAVENOUS AS NEEDED
OUTPATIENT
Start: 2024-08-21

## 2024-08-21 RX ORDER — SODIUM CHLORIDE 0.9 % (FLUSH) 0.9 %
3 SYRINGE (ML) INJECTION EVERY 12 HOURS SCHEDULED
OUTPATIENT
Start: 2024-08-21

## 2024-08-21 NOTE — PROGRESS NOTES
Chief Complaint: EGD    Subjective      EGD consultation       History of Present Illness  Meek Quiros is a 64 y.o. male presents to [facility] GENERAL SURGERY for EGD consultation.    Patient presents today on referral from Gayle Warren for an EGD consultation.  Patient reports that he has been having some heartburn and indigestion and chest pain.  Patient reports that he was recently started on omeprazole, and it is controlling his heartburn and indigestion, however he is still with chest pain.  Patient reports that he has been cleared from cardiology.  Reports that he has had this chest pain since he was diagnosed with COVID several years ago.  He denies any dysphagia.  Denies any epigastric pain.  Denies any pain before or after eating.  Denies any family history of colorectal cancer.    Patient denies DAVE.  Denies taking any GLP-1 receptors.    2/22: colonoscopy ([facility]): Sigmoid- hyperplastic.   Objective     Past Medical History:   Diagnosis Date    Acid reflux     Anemia 06/20/2023    Cancer Melanoma    Colon polyp long time ago    Diabetes mellitus 01/2020    Hyperlipidemia     Hypertension Long time ago       Past Surgical History:   Procedure Laterality Date    CARDIAC CATHETERIZATION N/A 01/19/2022    Procedure: Left Heart Cath;  Surgeon: Huber Perdue MD;  Location: [facility] CATH INVASIVE LOCATION;  Service: Cardiology;  Laterality: N/A;    COLONOSCOPY      COLONOSCOPY N/A 02/28/2022    Procedure: COLONOSCOPY WITH POLYPECTOMY;  Surgeon: Shahid Guerra MD;  Location: [facility] ENDOSCOPY;  Service: General;  Laterality: N/A;  COLON POLYP    HERNIA REPAIR      BILATERALLY    KNEE SURGERY Bilateral     EACH KNEE X 2 FOR TORN MENISCUS    OTHER SURGICAL HISTORY      MELONOMA ON BACK       Outpatient Medications Marked as Taking for the 8/21/24 encounter (Office Visit) with Cleopatra Hutchinson APRN   Medication Sig Dispense Refill    atorvastatin (LIPITOR) 10 MG tablet Take 1  "tablet by mouth Daily. 90 tablet 1    ferrous sulfate 325 (65 Fe) MG tablet Take 1 tablet by mouth Every Other Day.      hydroCHLOROthiazide 25 MG tablet Take 1 tablet by mouth Daily. 90 tablet 1    lisinopril (PRINIVIL,ZESTRIL) 40 MG tablet Take 1 tablet by mouth Daily. 90 tablet 1    metFORMIN ER (GLUCOPHAGE-XR) 500 MG 24 hr tablet Take 1 tablet by mouth Daily With Dinner. 90 tablet 1    multivitamin with minerals tablet tablet Take 1 tablet by mouth Daily.      omeprazole (priLOSEC) 40 MG capsule Take 1 capsule by mouth Daily. 30 capsule 0       No Known Allergies     Family History   Problem Relation Age of Onset    Heart attack Mother         unspecified valve issue    Cancer Father     Cancer Sister     Malig Hyperthermia Neg Hx        Social History     Socioeconomic History    Marital status:    Tobacco Use    Smoking status: Former     Current packs/day: 0.00     Average packs/day: 1 pack/day for 18.0 years (18.0 ttl pk-yrs)     Types: Cigarettes     Start date: 1968     Quit date: 1986     Years since quittin.6    Smokeless tobacco: Never    Tobacco comments:     Started at age 7   Vaping Use    Vaping status: Never Used   Substance and Sexual Activity    Alcohol use: Yes     Alcohol/week: 1.0 standard drink of alcohol     Types: 1 Glasses of wine per week     Comment: OCC    Drug use: Never    Sexual activity: Defer       Review of Systems   Constitutional:  Negative for chills and fever.   HENT:  Negative for trouble swallowing.    Respiratory:  Negative for chest tightness.    Cardiovascular:  Positive for chest pain.   Gastrointestinal:  Positive for GERD and indigestion. Negative for abdominal distention, abdominal pain, anal bleeding, blood in stool, constipation, diarrhea, nausea, rectal pain and vomiting.        Vital Signs:   /92 (BP Location: Left arm, Patient Position: Sitting, Cuff Size: Adult)   Pulse (!) 49   Ht 188 cm (74.02\")   Wt 103 kg (226 lb)   BMI 29.00 " kg/m²      Physical Exam  Vitals and nursing note reviewed.   Constitutional:       General: He is not in acute distress.     Appearance: Normal appearance. He is not ill-appearing.   HENT:      Head: Normocephalic and atraumatic.   Cardiovascular:      Rate and Rhythm: Normal rate.   Pulmonary:      Effort: Pulmonary effort is normal.      Breath sounds: No stridor.   Abdominal:      Palpations: Abdomen is soft.      Tenderness: There is no guarding.   Musculoskeletal:         General: No deformity. Normal range of motion.   Skin:     General: Skin is warm and dry.      Coloration: Skin is not jaundiced.   Neurological:      General: No focal deficit present.      Mental Status: He is alert and oriented to person, place, and time.   Psychiatric:         Mood and Affect: Mood normal.         Thought Content: Thought content normal.          Result Review :          []  Laboratory  []  Radiology  []  Pathology  []  Microbiology  []  EKG/Telemetry   []  Cardiology/Vascular   []  Old records  I spent 15 minutes caring for Meek on this date of service. This time includes time spent by me in the following activities: reviewing tests, obtaining and/or reviewing a separately obtained history, performing a medically appropriate examination and/or evaluation, ordering medications, tests, or procedures, and documenting information in the medical record.     Assessment and Plan    Diagnoses and all orders for this visit:    1. Gastroesophageal reflux disease without esophagitis (Primary)        Follow Up   Return for Scheduled EGD with Dr. Guerra on 9/30/2024 [facility].    Hospital arrival time: 0600    Possible risks/complications, benefits, and alternatives to surgical or invasive procedures have been explained to patient and/or legal guardian.    Patient has been evaluated and can tolerate anesthesia and/or sedation. Risks, benefits, and alternatives to anesthesia and sedation have been explained to the  patient and/or legal guardian. Patient verbalizes understanding and is willing to proceed with the above plan.       Patient was given instructions and counseling regarding his condition or for health maintenance advice. Please see specific information pulled into the AVS if appropriate.     Thank you for allowing me to participate in the care of this patient. Please call with questions or concerns.

## 2024-08-22 ENCOUNTER — HOSPITAL ENCOUNTER (OUTPATIENT)
Dept: SLEEP MEDICINE | Facility: HOSPITAL | Age: 64
Discharge: HOME | End: 2024-08-22
Admitting: FAMILY MEDICINE
Payer: MEDICARE

## 2024-08-22 DIAGNOSIS — G47.10 HYPERSOMNIA: ICD-10-CM

## 2024-08-22 DIAGNOSIS — U09.9 COVID-19 LONG HAULER: ICD-10-CM

## 2024-08-22 DIAGNOSIS — E66.3 OVERWEIGHT WITH BODY MASS INDEX (BMI) 25.0-29.9: ICD-10-CM

## 2024-08-22 DIAGNOSIS — G47.8 NON-RESTORATIVE SLEEP: ICD-10-CM

## 2024-08-22 PROCEDURE — 95806 SLEEP STUDY UNATT&RESP EFFT: CPT

## 2024-08-26 ENCOUNTER — TELEPHONE (OUTPATIENT)
Dept: SURGERY | Facility: CLINIC | Age: 64
End: 2024-08-26
Payer: COMMERCIAL

## 2024-08-26 NOTE — TELEPHONE ENCOUNTER
[facility] GEN SURG [place]  [facility] GENERAL SURGERY  [address]  [place] [address]  Fax [phone]  Phone [phone]     Patient: Meek Quiros  YOB: 1960      This patient is waiting to have a Esophagogastroduodenoscopy which will be perform at [facility] on 9/30/2024 by Dr. Guerra.     Please respond to this request noting your recommendations. You may contact our office at [phone] Option 1 then 4 with any questions. I appreciate your prompt response in this matter.     Please return this form to our office as soon as possible to [phone].      ____ I approve my patient from a cardiac standpoint.    ____ I do NOT approve my patient from a cardiac standpoint at this time.    Approving physician name (please print):     _____________________________________________    Approving physician signature:     ________________________________            Date:________________      Sincerely,    MARY Fernandez

## 2024-08-27 NOTE — TELEPHONE ENCOUNTER
Patient needs to schedule a f/u with provider- can be APRN for sooner appointment. Last appointment over 2 years ago.

## 2024-08-29 ENCOUNTER — TELEPHONE (OUTPATIENT)
Dept: CARDIOLOGY | Facility: CLINIC | Age: 64
End: 2024-08-29
Payer: COMMERCIAL

## 2024-08-29 NOTE — TELEPHONE ENCOUNTER
Pt is aware that he will need to schedule an appointment with Dr. Perdue office to get clearance for his EGD that is scheduled on 9/30/24 with Dr. Guerra. Pt V/U.

## 2024-08-29 NOTE — TELEPHONE ENCOUNTER
REQUEST FOR CARDIAC CLEARANCE    Caller name: Meek Quiros     Phone Number: [phone]    Surgeon's name: DR. ROD    Type of planned surgery: THROAT SCOPE    Date of planned surgery: 9-30-24    Type of anesthesia: ?    Have you been experiencing chest pain or shortness of breath? NO    Is your doctor requesting for you to stop any of your medications prior to your surgery? NO    Where should we fax the clearance to? SENT TO DR. ROD'S OFFICE      LAST SEEN 3-8-22

## 2024-09-06 DIAGNOSIS — U09.9 COVID-19 LONG HAULER: ICD-10-CM

## 2024-09-06 DIAGNOSIS — G47.10 HYPERSOMNIA: ICD-10-CM

## 2024-09-06 DIAGNOSIS — E66.3 OVERWEIGHT WITH BODY MASS INDEX (BMI) 25.0-29.9: ICD-10-CM

## 2024-09-06 DIAGNOSIS — G47.33 OBSTRUCTIVE SLEEP APNEA: Primary | ICD-10-CM

## 2024-09-06 DIAGNOSIS — G47.8 NON-RESTORATIVE SLEEP: ICD-10-CM

## 2024-09-09 ENCOUNTER — OFFICE VISIT (OUTPATIENT)
Dept: CARDIOLOGY | Facility: CLINIC | Age: 64
End: 2024-09-09
Payer: MEDICARE

## 2024-09-09 ENCOUNTER — TELEPHONE (OUTPATIENT)
Dept: CARDIOLOGY | Facility: CLINIC | Age: 64
End: 2024-09-09
Payer: COMMERCIAL

## 2024-09-09 VITALS
HEART RATE: 55 BPM | OXYGEN SATURATION: 100 % | BODY MASS INDEX: 28.23 KG/M2 | SYSTOLIC BLOOD PRESSURE: 116 MMHG | HEIGHT: 74 IN | WEIGHT: 220 LBS | DIASTOLIC BLOOD PRESSURE: 55 MMHG

## 2024-09-09 DIAGNOSIS — Z01.810 PREOP CARDIOVASCULAR EXAM: Primary | ICD-10-CM

## 2024-09-09 DIAGNOSIS — E78.2 MIXED HYPERLIPIDEMIA: ICD-10-CM

## 2024-09-09 DIAGNOSIS — R07.89 CHEST PAIN, ATYPICAL: ICD-10-CM

## 2024-09-09 DIAGNOSIS — I10 ESSENTIAL HYPERTENSION: ICD-10-CM

## 2024-09-09 PROCEDURE — 1160F RVW MEDS BY RX/DR IN RCRD: CPT | Performed by: NURSE PRACTITIONER

## 2024-09-09 PROCEDURE — 3078F DIAST BP <80 MM HG: CPT | Performed by: NURSE PRACTITIONER

## 2024-09-09 PROCEDURE — 1159F MED LIST DOCD IN RCRD: CPT | Performed by: NURSE PRACTITIONER

## 2024-09-09 PROCEDURE — 99214 OFFICE O/P EST MOD 30 MIN: CPT | Performed by: NURSE PRACTITIONER

## 2024-09-09 PROCEDURE — 3074F SYST BP LT 130 MM HG: CPT | Performed by: NURSE PRACTITIONER

## 2024-09-09 NOTE — TELEPHONE ENCOUNTER
Received the following from Hannah HERNANDEZ: Can you send cardiac clearance on this patient, to Dr. Guerra for endoscopy. Low, stable cardiovascular risk.

## 2024-09-09 NOTE — PROGRESS NOTES
Chief Complaint  No chief complaint on file.    Subjective            Meek Quiros presents to [facility] CARDIOLOGY  History of Present Illness    Venu is here for follow-up evaluation management of atypical chest pain, essential hypertension, dyslipidemia.  It has been a couple of years since he was seen in the office.  He is back today for preoperative clearance for upcoming endoscopy.  He underwent cardiac catheterization early  due to atypical chest pain.  This showed mild, nonobstructive coronary artery disease.  All of his symptoms began after his COVID illness.  He still occasionally has atypical chest pains but there has been no change in that symptom.  He denies excessive shortness of breath, palpitations, dizziness, presyncope, or syncope.    [facility]  Past Medical History:   Diagnosis Date    Acid reflux     Anemia 2023    Cancer Melanoma    Colon polyp long time ago    Diabetes mellitus 2020    Hyperlipidemia     Hypertension Long time ago         SURGICALHX  Past Surgical History:   Procedure Laterality Date    CARDIAC CATHETERIZATION N/A 2022    Procedure: Left Heart Cath;  Surgeon: Huber Perdue MD;  Location: [facility] CATH INVASIVE LOCATION;  Service: Cardiology;  Laterality: N/A;    COLONOSCOPY      COLONOSCOPY N/A 2022    Procedure: COLONOSCOPY WITH POLYPECTOMY;  Surgeon: Shahid Guerra MD;  Location: [facility] ENDOSCOPY;  Service: General;  Laterality: N/A;  COLON POLYP    HERNIA REPAIR      BILATERALLY    KNEE SURGERY Bilateral     EACH KNEE X 2 FOR TORN MENISCUS    OTHER SURGICAL HISTORY      MELONOMA ON BACK        SOC  Social History     Socioeconomic History    Marital status:    Tobacco Use    Smoking status: Former     Current packs/day: 0.00     Average packs/day: 1 pack/day for 18.0 years (18.0 ttl pk-yrs)     Types: Cigarettes     Start date: 1968     Quit date: 1986     Years since quittin.7     Passive exposure: Past  "   Smokeless tobacco: Never    Tobacco comments:     Started at age 7   Vaping Use    Vaping status: Never Used   Substance and Sexual Activity    Alcohol use: Yes     Alcohol/week: 1.0 standard drink of alcohol     Types: 1 Glasses of wine per week     Comment: OCC    Drug use: Never    Sexual activity: Defer         FAMHX  Family History   Problem Relation Age of Onset    Heart attack Mother         unspecified valve issue    Cancer Father     Cancer Sister     M[place] Hyperthermia Neg Hx           ALLERGY  No Known Allergies     MEDSCURRENT    Current Outpatient Medications:     atorvastatin (LIPITOR) 10 MG tablet, Take 1 tablet by mouth Daily., Disp: 90 tablet, Rfl: 1    ferrous sulfate 325 (65 Fe) MG tablet, Take 1 tablet by mouth Every Other Day., Disp: , Rfl:     hydroCHLOROthiazide 25 MG tablet, Take 1 tablet by mouth Daily., Disp: 90 tablet, Rfl: 1    lisinopril (PRINIVIL,ZESTRIL) 40 MG tablet, Take 1 tablet by mouth Daily., Disp: 90 tablet, Rfl: 1    metFORMIN ER (GLUCOPHAGE-XR) 500 MG 24 hr tablet, Take 1 tablet by mouth Daily With Dinner., Disp: 90 tablet, Rfl: 1    multivitamin with minerals tablet tablet, Take 1 tablet by mouth Daily., Disp: , Rfl:     omeprazole (priLOSEC) 40 MG capsule, Take 1 capsule by mouth Daily., Disp: 30 capsule, Rfl: 0    albuterol sulfate  (90 Base) MCG/ACT inhaler, Inhale 2 puffs Every 4 (Four) Hours As Needed for Wheezing or Shortness of Air. (Patient not taking: Reported on 8/20/2024), Disp: 8 g, Rfl: 0      Review of Systems   Cardiovascular:  Positive for chest pain. Negative for dyspnea on exertion, irregular heartbeat, near-syncope, palpitations and syncope.        Objective     /55 (BP Location: Right arm, Patient Position: Sitting, Cuff Size: Adult)   Pulse 55   Ht 188 cm (74.02\")   Wt 99.8 kg (220 lb)   SpO2 100%   BMI 28.23 kg/m²       General Appearance:   well developed  well nourished  HENT:   oropharynx moist  lips not cyanotic  Neck:  thyroid " not enlarged  supple  Respiratory:  no respiratory distress  normal breath sounds  no rales  Cardiovascular:  no jugular venous distention  regular rhythm  apical impulse normal  S1 normal, S2 normal  no S3, no S4   no murmur  no rub, no thrill  carotid pulses normal; no bruit  pedal pulses normal  lower extremity edema: none    Musculoskeletal:  no clubbing of fingers.   normocephalic, head atraumatic  Skin:   warm, dry  Psychiatric:  judgement and insight appropriate  normal mood and affect      Result Review :     The following data was reviewed by: MARY Myrick on 09/09/2024:    CMP          1/16/2024    10:02 7/16/2024    10:12   CMP   Glucose 118  124    BUN 19  33    Creatinine 1.12  1.21    EGFR 72.9  66.0    Sodium 138  138    Potassium 4.6  5.1    Chloride 101  104    Calcium 10.1  9.6    Total Protein 6.9  6.6    Albumin 4.6  4.2    Globulin 2.3  2.4    Total Bilirubin 0.5  0.3    Alkaline Phosphatase 65  63    AST (SGOT) 18  15    ALT (SGPT) 26  18    Albumin/Globulin Ratio 2.0  1.8    BUN/Creatinine Ratio 17.0  27.3    Anion Gap 12.0  9.0      CBC          1/16/2024    10:02   CBC   WBC 7.78    RBC 4.81    Hemoglobin 15.0    Hematocrit 44.3    MCV 92.1    MCH 31.2    MCHC 33.9    RDW 12.1    Platelets 310      Lipid Panel          1/16/2024    10:02 7/16/2024    10:12   Lipid Panel   Total Cholesterol 141  148    Triglycerides 210  149    HDL Cholesterol 32  34    VLDL Cholesterol 35  26    LDL Cholesterol  74  88    LDL/HDL Ratio 2.09  2.48          Data reviewed : Recent EKG reviewed.  No ischemic ST changes.  Previous cardiac catheterization reviewed as above.  Labs reviewed.    Procedures      Meek Quiros  reports that he quit smoking about 38 years ago. His smoking use included cigarettes. He started smoking about 56 years ago. He has a 18 pack-year smoking history. He has been exposed to tobacco smoke. He has never used smokeless tobacco. I have educated him on the risk of  diseases from using tobacco products such as cancer, COPD, and heart disease.                Assessment and Plan        ASSESSMENT:  Encounter Diagnoses   Name Primary?    Preop cardiovascular exam Yes    Chest pain, atypical     Essential hypertension     Mixed hyperlipidemia          PLAN:    1.  Atypical chest pain, stable.  Symptoms have been going on for several years, after a COVID illness.  No change in symptoms and cardiac catheterization did not show any obstructive CAD.  Continue secondary prevention strategies.  2.  Essential hypertension controlled, continue current medical therapy.  3.  Dyslipidemia, lipids are well-controlled on statin therapy.  Continue.    Patient is low, stable cardiovascular risk for endoscopy.  No additional testing necessary.      Patient was given instructions and counseling regarding his condition or for health maintenance advice. Please see specific information pulled into the AVS if appropriate.           Alondra Roy, APRN   9/9/2024  08:59 EDT

## 2024-09-13 ENCOUNTER — TELEPHONE (OUTPATIENT)
Dept: SLEEP MEDICINE | Facility: HOSPITAL | Age: 64
End: 2024-09-13
Payer: COMMERCIAL

## 2024-09-13 NOTE — TELEPHONE ENCOUNTER
..Spoke with patient about sleep study results , sending orders to Dilshad schilling, compliance follow up will be scheduled once set up on CPAP

## 2024-09-23 NOTE — PRE-PROCEDURE INSTRUCTIONS
"Instructed on date and arrival time of 0600. Instructed that arrival time is not procedure time but allows time to prepare for procedure. Come to entrance \"C\".  Must have  over age 18 to drive home.  May have two visitors; however, children under 12 must stay in waiting room.  Discussed diet/NPO.  May take medications as usual except for blood thinners, diabetic medications, or weight loss medications.  Verbalized understanding of instructions given.  Instructed to call for questions or concerns.  "

## 2024-09-27 ENCOUNTER — ANESTHESIA EVENT (OUTPATIENT)
Dept: GASTROENTEROLOGY | Facility: HOSPITAL | Age: 64
End: 2024-09-27
Payer: MEDICARE

## 2024-09-27 NOTE — ANESTHESIA PREPROCEDURE EVALUATION
Anesthesia Evaluation     Patient summary reviewed and Nursing notes reviewed   NPO Solid Status: > 8 hours  NPO Liquid Status: > 8 hours           Airway   Mallampati: I  TM distance: >3 FB  Neck ROM: full  No difficulty expected  Dental - normal exam     Pulmonary - normal exam    breath sounds clear to auscultation  (+) ,shortness of breath  Cardiovascular - normal exam  Exercise tolerance: good (4-7 METS)    Rhythm: regular  Rate: normal    (+) hypertension well controlled 2 medications or greater, hyperlipidemia      Neuro/Psych  (+) numbness (bilateral sciatica)  GI/Hepatic/Renal/Endo    (+) GERD well controlled, diabetes mellitus type 2 well controlled    Musculoskeletal     Abdominal    Substance History      OB/GYN          Other      history of cancer (melanoma - remission)    ROS/Med Hx Other: ECHO  Interpretation Summary  Estimated left ventricular EF was in agreement with the calculated left ventricular EF. Left ventricular ejection fraction appears to be 56 - 60%. Left ventricular systolic function is normal.  Left ventricular diastolic function was normal.  ·Estimated right ventricular systolic pressure from tricuspid regurgitation is normal (<35 mmHg).  ·Adequate exercise tolerance. Hypertensive blood pressure response. Exercise-induced EKG changes suggestive of ischemia.  ·Echocardiographic evidence of exercise-induced ischemia in the yazan-lateral wall    Scanned EKG July 2024- HR 61  Ectopic atrial rhythm.                     Anesthesia Plan    ASA 2     general   total IV anesthesia  (Total IV Anesthesia    Patient understands anesthesia not responsible for dental damage.  )  intravenous induction     Anesthetic plan, risks, benefits, and alternatives have been provided, discussed and informed consent has been obtained with: patient and spouse/significant other.  Pre-procedure education provided  Plan discussed with CRNA.      CODE STATUS:

## 2024-09-30 ENCOUNTER — ANESTHESIA (OUTPATIENT)
Dept: GASTROENTEROLOGY | Facility: HOSPITAL | Age: 64
End: 2024-09-30
Payer: MEDICARE

## 2024-09-30 ENCOUNTER — HOSPITAL ENCOUNTER (OUTPATIENT)
Facility: HOSPITAL | Age: 64
Setting detail: HOSPITAL OUTPATIENT SURGERY
Discharge: HOME | End: 2024-09-30
Attending: SURGERY | Admitting: SURGERY
Payer: MEDICARE

## 2024-09-30 VITALS
OXYGEN SATURATION: 95 % | HEART RATE: 49 BPM | RESPIRATION RATE: 15 BRPM | TEMPERATURE: 96.5 F | BODY MASS INDEX: 28.69 KG/M2 | DIASTOLIC BLOOD PRESSURE: 63 MMHG | WEIGHT: 223.55 LBS | SYSTOLIC BLOOD PRESSURE: 129 MMHG

## 2024-09-30 DIAGNOSIS — K21.00 GASTROESOPHAGEAL REFLUX DISEASE WITH ESOPHAGITIS WITHOUT HEMORRHAGE: ICD-10-CM

## 2024-09-30 LAB — GLUCOSE BLDC GLUCOMTR-MCNC: 128 MG/DL (ref 70–99)

## 2024-09-30 PROCEDURE — 25810000003 LACTATED RINGERS PER 1000 ML: Performed by: NURSE ANESTHETIST, CERTIFIED REGISTERED

## 2024-09-30 PROCEDURE — 88305 TISSUE EXAM BY PATHOLOGIST: CPT | Performed by: SURGERY

## 2024-09-30 PROCEDURE — 82948 REAGENT STRIP/BLOOD GLUCOSE: CPT | Performed by: NURSE ANESTHETIST, CERTIFIED REGISTERED

## 2024-09-30 PROCEDURE — 25010000002 PROPOFOL 10 MG/ML EMULSION: Performed by: NURSE ANESTHETIST, CERTIFIED REGISTERED

## 2024-09-30 RX ORDER — SODIUM CHLORIDE 9 MG/ML
40 INJECTION, SOLUTION INTRAVENOUS AS NEEDED
Status: DISCONTINUED | OUTPATIENT
Start: 2024-09-30 | End: 2024-09-30 | Stop reason: HOSPADM

## 2024-09-30 RX ORDER — PROPOFOL 10 MG/ML
VIAL (ML) INTRAVENOUS AS NEEDED
Status: DISCONTINUED | OUTPATIENT
Start: 2024-09-30 | End: 2024-09-30 | Stop reason: SURG

## 2024-09-30 RX ORDER — SODIUM CHLORIDE 0.9 % (FLUSH) 0.9 %
3 SYRINGE (ML) INJECTION EVERY 12 HOURS SCHEDULED
Status: DISCONTINUED | OUTPATIENT
Start: 2024-09-30 | End: 2024-09-30 | Stop reason: HOSPADM

## 2024-09-30 RX ORDER — SODIUM CHLORIDE 0.9 % (FLUSH) 0.9 %
10 SYRINGE (ML) INJECTION AS NEEDED
Status: DISCONTINUED | OUTPATIENT
Start: 2024-09-30 | End: 2024-09-30 | Stop reason: HOSPADM

## 2024-09-30 RX ORDER — SODIUM CHLORIDE, SODIUM LACTATE, POTASSIUM CHLORIDE, CALCIUM CHLORIDE 600; 310; 30; 20 MG/100ML; MG/100ML; MG/100ML; MG/100ML
30 INJECTION, SOLUTION INTRAVENOUS CONTINUOUS
Status: DISCONTINUED | OUTPATIENT
Start: 2024-09-30 | End: 2024-09-30 | Stop reason: HOSPADM

## 2024-09-30 RX ORDER — ONDANSETRON 4 MG/1
4 TABLET, ORALLY DISINTEGRATING ORAL ONCE AS NEEDED
Status: DISCONTINUED | OUTPATIENT
Start: 2024-09-30 | End: 2024-09-30 | Stop reason: HOSPADM

## 2024-09-30 RX ORDER — ONDANSETRON 2 MG/ML
4 INJECTION INTRAMUSCULAR; INTRAVENOUS ONCE AS NEEDED
Status: DISCONTINUED | OUTPATIENT
Start: 2024-09-30 | End: 2024-09-30 | Stop reason: HOSPADM

## 2024-09-30 RX ORDER — LIDOCAINE HYDROCHLORIDE 20 MG/ML
INJECTION, SOLUTION EPIDURAL; INFILTRATION; INTRACAUDAL; PERINEURAL AS NEEDED
Status: DISCONTINUED | OUTPATIENT
Start: 2024-09-30 | End: 2024-09-30 | Stop reason: SURG

## 2024-09-30 RX ADMIN — PROPOFOL 200 MCG/KG/MIN: 10 INJECTION, EMULSION INTRAVENOUS at 07:41

## 2024-09-30 RX ADMIN — PROPOFOL 100 MG: 10 INJECTION, EMULSION INTRAVENOUS at 07:41

## 2024-09-30 RX ADMIN — LIDOCAINE HYDROCHLORIDE 100 MG: 20 INJECTION, SOLUTION INTRAVENOUS at 07:41

## 2024-09-30 RX ADMIN — SODIUM CHLORIDE, POTASSIUM CHLORIDE, SODIUM LACTATE AND CALCIUM CHLORIDE 30 ML/HR: 600; 310; 30; 20 INJECTION, SOLUTION INTRAVENOUS at 06:23

## 2024-09-30 NOTE — ANESTHESIA POSTPROCEDURE EVALUATION
Patient: Meek Quiros    Procedure Summary       Date: 09/30/24 Room / Location: [facility] ENDOSCOPY 1 / [facility]    Anesthesia Start: 0738 Anesthesia Stop: 0759    Procedure: ESOPHAGOGASTRODUODENOSCOPY with biopsies Diagnosis:       Gastroesophageal reflux disease with esophagitis without hemorrhage      (Gastroesophageal reflux disease with esophagitis without hemorrhage [K21.00])    Surgeons: Shahid Guerra MD Provider: Vashti Su CRNA    Anesthesia Type: general ASA Status: 2            Anesthesia Type: general    Vitals  Vitals Value Taken Time   /63 09/30/24 0808   Temp 36.2 °C (97.2 °F) 09/30/24 0758   Pulse 49 09/30/24 0811   Resp 14 09/30/24 0803   SpO2 98 % 09/30/24 0811   Vitals shown include unfiled device data.        Post Anesthesia Care and Evaluation    Post-procedure mental status: acceptable.  Pain management: satisfactory to patient    Airway patency: patent  Anesthetic complications: No anesthetic complications    Cardiovascular status: acceptable  Respiratory status: acceptable    Comments: Per chart review

## 2024-09-30 NOTE — H&P
[facility]   HISTORY AND PHYSICAL    Patient Name: Meek Quiros  : 1960  MRN: [id number]  Primary Care Physician:  Gayle Terry APRN  Date of admission: 2024    Subjective   Subjective     Chief Complaint: Persistent GERD symptoms    HPI:    Meek Quiros is a 64 y.o. male who presents with persistent GERD symptoms.    Review of Systems   Respiratory:  Negative for shortness of breath.    Cardiovascular:  Negative for chest pain.       Personal History     Past Medical History:   Diagnosis Date    Acid reflux     Anemia 2023    Cancer Melanoma    Colon polyp long time ago    Diabetes mellitus 2020    Hyperlipidemia     Hypertension Long time ago       Past Surgical History:   Procedure Laterality Date    CARDIAC CATHETERIZATION N/A 2022    Procedure: Left Heart Cath;  Surgeon: Huber Perdue MD;  Location: [facility] CATH INVASIVE LOCATION;  Service: Cardiology;  Laterality: N/A;    COLONOSCOPY      COLONOSCOPY N/A 2022    Procedure: COLONOSCOPY WITH POLYPECTOMY;  Surgeon: Shahid Guerra MD;  Location: [facility] ENDOSCOPY;  Service: General;  Laterality: N/A;  COLON POLYP    HERNIA REPAIR      BILATERALLY    KNEE SURGERY Bilateral     EACH KNEE X 2 FOR TORN MENISCUS    OTHER SURGICAL HISTORY      MELONOMA ON BACK       Family History: family history includes Cancer in his father and sister; Heart attack in his mother. Otherwise pertinent FHx was reviewed and not pertinent to current issue.    Social History:  reports that he quit smoking about 38 years ago. His smoking use included cigarettes. He started smoking about 56 years ago. He has a 18 pack-year smoking history. He has been exposed to tobacco smoke. He has never used smokeless tobacco. He reports current alcohol use of about 1.0 standard drink of alcohol per week. He reports that he does not use drugs.    Home Medications:  atorvastatin, ferrous sulfate, hydroCHLOROthiazide, lisinopril, metFORMIN ER,  multivitamin with minerals, and omeprazole    Allergies:  No Known Allergies    Objective    Objective     Vitals:   Temp:  [97.7 °F (36.5 °C)] 97.7 °F (36.5 °C)  Heart Rate:  [52] 52  Resp:  [16] 16  BP: (146)/(71) 146/71    Physical Exam  HENT:      Head: Normocephalic.   Cardiovascular:      Rate and Rhythm: Normal rate.   Pulmonary:      Effort: Pulmonary effort is normal.   Abdominal:      Palpations: Abdomen is soft.   Musculoskeletal:         General: Normal range of motion.      Cervical back: Normal range of motion.   Skin:     General: Skin is warm.   Neurological:      General: No focal deficit present.      Mental Status: He is alert.   Psychiatric:         Mood and Affect: Mood normal.         Result Review    Result Review:  I have personally reviewed the results from the time of this admission to 9/30/2024 07:28 EDT and agree with these findings:  []  Laboratory  []  Microbiology  []  Radiology  []  EKG/Telemetry   []  Cardiology/Vascular   []  Pathology  []  Old records  []  Other:  Most notable findings include:     Assessment & Plan   Assessment / Plan     Brief Patient Summary:  Meek Quiros is a 64 y.o. male who presents with persistent GERD symptoms.    Active Hospital Problems:  Active Hospital Problems    Diagnosis     **Gastroesophageal reflux disease        Plan:   We will proceed with an EGD.  I have described the procedure to him as well as the risk and benefits and he is agreeable to proceeding.    VTE Prophylaxis:  Mechanical VTE prophylaxis orders are present.        CODE STATUS:         Admission Status:  I believe this patient meets outpatient status.    Electronically signed by Shahid Guerra MD, 09/30/24, 7:28 AM EDT.

## 2025-01-20 DIAGNOSIS — E11.9 TYPE 2 DIABETES MELLITUS WITHOUT COMPLICATION, WITHOUT LONG-TERM CURRENT USE OF INSULIN: ICD-10-CM

## 2025-01-20 DIAGNOSIS — E78.2 MIXED HYPERLIPIDEMIA: ICD-10-CM

## 2025-01-20 DIAGNOSIS — I10 PRIMARY HYPERTENSION: ICD-10-CM

## 2025-01-21 ENCOUNTER — OFFICE VISIT (OUTPATIENT)
Dept: FAMILY MEDICINE CLINIC | Age: 65
End: 2025-01-21
Payer: MEDICARE

## 2025-01-21 ENCOUNTER — LAB (OUTPATIENT)
Dept: LAB | Facility: HOSPITAL | Age: 65
End: 2025-01-21
Payer: MEDICARE

## 2025-01-21 VITALS
WEIGHT: 229 LBS | DIASTOLIC BLOOD PRESSURE: 68 MMHG | SYSTOLIC BLOOD PRESSURE: 130 MMHG | BODY MASS INDEX: 29.39 KG/M2 | TEMPERATURE: 97.7 F | OXYGEN SATURATION: 98 % | HEIGHT: 74 IN | HEART RATE: 51 BPM

## 2025-01-21 DIAGNOSIS — E11.9 TYPE 2 DIABETES MELLITUS WITHOUT COMPLICATION, WITHOUT LONG-TERM CURRENT USE OF INSULIN: ICD-10-CM

## 2025-01-21 DIAGNOSIS — I10 PRIMARY HYPERTENSION: ICD-10-CM

## 2025-01-21 DIAGNOSIS — Z00.00 MEDICARE ANNUAL WELLNESS VISIT, SUBSEQUENT: Primary | ICD-10-CM

## 2025-01-21 DIAGNOSIS — G47.30 SLEEP APNEA, UNSPECIFIED TYPE: ICD-10-CM

## 2025-01-21 DIAGNOSIS — K21.9 GASTROESOPHAGEAL REFLUX DISEASE, UNSPECIFIED WHETHER ESOPHAGITIS PRESENT: ICD-10-CM

## 2025-01-21 DIAGNOSIS — D64.9 ANEMIA, UNSPECIFIED TYPE: ICD-10-CM

## 2025-01-21 DIAGNOSIS — E78.2 MIXED HYPERLIPIDEMIA: ICD-10-CM

## 2025-01-21 PROBLEM — H00.021 HORDEOLUM INTERNUM OF RIGHT UPPER EYELID: Status: RESOLVED | Noted: 2021-11-10 | Resolved: 2025-01-21

## 2025-01-21 PROBLEM — R05.1 ACUTE COUGH: Status: RESOLVED | Noted: 2023-07-11 | Resolved: 2025-01-21

## 2025-01-21 PROBLEM — R07.9 CHEST PAIN: Status: RESOLVED | Noted: 2024-07-16 | Resolved: 2025-01-21

## 2025-01-21 PROBLEM — J40 BRONCHITIS: Status: RESOLVED | Noted: 2023-07-11 | Resolved: 2025-01-21

## 2025-01-21 PROBLEM — R06.00 DYSPNEA: Status: RESOLVED | Noted: 2024-07-16 | Resolved: 2025-01-21

## 2025-01-21 LAB
ALBUMIN SERPL-MCNC: 4.1 G/DL (ref 3.5–5.2)
ALBUMIN UR-MCNC: <1.2 MG/DL
ALBUMIN/GLOB SERPL: 1.5 G/DL
ALP SERPL-CCNC: 69 U/L (ref 39–117)
ALT SERPL W P-5'-P-CCNC: 23 U/L (ref 1–41)
ANION GAP SERPL CALCULATED.3IONS-SCNC: 12 MMOL/L (ref 5–15)
AST SERPL-CCNC: 20 U/L (ref 1–40)
BASOPHILS # BLD AUTO: 0.06 10*3/MM3 (ref 0–0.2)
BASOPHILS NFR BLD AUTO: 0.9 % (ref 0–1.5)
BILIRUB SERPL-MCNC: 0.4 MG/DL (ref 0–1.2)
BUN SERPL-MCNC: 17 MG/DL (ref 8–23)
BUN/CREAT SERPL: 15.6 (ref 7–25)
CALCIUM SPEC-SCNC: 9.5 MG/DL (ref 8.6–10.5)
CHLORIDE SERPL-SCNC: 105 MMOL/L (ref 98–107)
CHOLEST SERPL-MCNC: 153 MG/DL (ref 0–200)
CO2 SERPL-SCNC: 26 MMOL/L (ref 22–29)
CREAT SERPL-MCNC: 1.09 MG/DL (ref 0.76–1.27)
CREAT UR-MCNC: 136.7 MG/DL
DEPRECATED RDW RBC AUTO: 45.8 FL (ref 37–54)
EGFRCR SERPLBLD CKD-EPI 2021: 74.9 ML/MIN/1.73
EOSINOPHIL # BLD AUTO: 0.12 10*3/MM3 (ref 0–0.4)
EOSINOPHIL NFR BLD AUTO: 1.9 % (ref 0.3–6.2)
ERYTHROCYTE [DISTWIDTH] IN BLOOD BY AUTOMATED COUNT: 12.9 % (ref 12.3–15.4)
GLOBULIN UR ELPH-MCNC: 2.7 GM/DL
GLUCOSE SERPL-MCNC: 124 MG/DL (ref 65–99)
HBA1C MFR BLD: 6.4 % (ref 4.8–5.6)
HCT VFR BLD AUTO: 45.3 % (ref 37.5–51)
HDLC SERPL-MCNC: 37 MG/DL (ref 40–60)
HGB BLD-MCNC: 14.5 G/DL (ref 13–17.7)
IMM GRANULOCYTES # BLD AUTO: 0.04 10*3/MM3 (ref 0–0.05)
IMM GRANULOCYTES NFR BLD AUTO: 0.6 % (ref 0–0.5)
IRON 24H UR-MRATE: 109 MCG/DL (ref 59–158)
IRON SATN MFR SERPL: 29 % (ref 20–50)
LDLC SERPL CALC-MCNC: 91 MG/DL (ref 0–100)
LDLC/HDLC SERPL: 2.36 {RATIO}
LYMPHOCYTES # BLD AUTO: 2.11 10*3/MM3 (ref 0.7–3.1)
LYMPHOCYTES NFR BLD AUTO: 32.6 % (ref 19.6–45.3)
MCH RBC QN AUTO: 30.3 PG (ref 26.6–33)
MCHC RBC AUTO-ENTMCNC: 32 G/DL (ref 31.5–35.7)
MCV RBC AUTO: 94.8 FL (ref 79–97)
MICROALBUMIN/CREAT UR: NORMAL MG/G{CREAT}
MONOCYTES # BLD AUTO: 0.75 10*3/MM3 (ref 0.1–0.9)
MONOCYTES NFR BLD AUTO: 11.6 % (ref 5–12)
NEUTROPHILS NFR BLD AUTO: 3.39 10*3/MM3 (ref 1.7–7)
NEUTROPHILS NFR BLD AUTO: 52.4 % (ref 42.7–76)
PLATELET # BLD AUTO: 288 10*3/MM3 (ref 140–450)
PMV BLD AUTO: 9.2 FL (ref 6–12)
POTASSIUM SERPL-SCNC: 4.7 MMOL/L (ref 3.5–5.2)
PROT SERPL-MCNC: 6.8 G/DL (ref 6–8.5)
RBC # BLD AUTO: 4.78 10*6/MM3 (ref 4.14–5.8)
SODIUM SERPL-SCNC: 143 MMOL/L (ref 136–145)
TIBC SERPL-MCNC: 377 MCG/DL (ref 298–536)
TRANSFERRIN SERPL-MCNC: 253 MG/DL (ref 200–360)
TRIGL SERPL-MCNC: 144 MG/DL (ref 0–150)
VLDLC SERPL-MCNC: 25 MG/DL (ref 5–40)
WBC NRBC COR # BLD AUTO: 6.47 10*3/MM3 (ref 3.4–10.8)

## 2025-01-21 PROCEDURE — 85025 COMPLETE CBC W/AUTO DIFF WBC: CPT

## 2025-01-21 PROCEDURE — 80053 COMPREHEN METABOLIC PANEL: CPT

## 2025-01-21 PROCEDURE — 36415 COLL VENOUS BLD VENIPUNCTURE: CPT

## 2025-01-21 PROCEDURE — 82570 ASSAY OF URINE CREATININE: CPT

## 2025-01-21 PROCEDURE — G0438 PPPS, INITIAL VISIT: HCPCS | Performed by: NURSE PRACTITIONER

## 2025-01-21 PROCEDURE — 83540 ASSAY OF IRON: CPT

## 2025-01-21 PROCEDURE — 1125F AMNT PAIN NOTED PAIN PRSNT: CPT | Performed by: NURSE PRACTITIONER

## 2025-01-21 PROCEDURE — 3075F SYST BP GE 130 - 139MM HG: CPT | Performed by: NURSE PRACTITIONER

## 2025-01-21 PROCEDURE — 3078F DIAST BP <80 MM HG: CPT | Performed by: NURSE PRACTITIONER

## 2025-01-21 PROCEDURE — 82043 UR ALBUMIN QUANTITATIVE: CPT

## 2025-01-21 PROCEDURE — 80061 LIPID PANEL: CPT

## 2025-01-21 PROCEDURE — 1159F MED LIST DOCD IN RCRD: CPT | Performed by: NURSE PRACTITIONER

## 2025-01-21 PROCEDURE — 1170F FXNL STATUS ASSESSED: CPT | Performed by: NURSE PRACTITIONER

## 2025-01-21 PROCEDURE — 83036 HEMOGLOBIN GLYCOSYLATED A1C: CPT

## 2025-01-21 PROCEDURE — 84466 ASSAY OF TRANSFERRIN: CPT

## 2025-01-21 PROCEDURE — 1160F RVW MEDS BY RX/DR IN RCRD: CPT | Performed by: NURSE PRACTITIONER

## 2025-01-21 RX ORDER — HYDROCHLOROTHIAZIDE 25 MG/1
25 TABLET ORAL DAILY
Qty: 90 TABLET | Refills: 1 | Status: SHIPPED | OUTPATIENT
Start: 2025-01-21

## 2025-01-21 RX ORDER — OMEPRAZOLE 40 MG/1
40 CAPSULE, DELAYED RELEASE ORAL DAILY
Qty: 90 CAPSULE | Refills: 1 | Status: SHIPPED | OUTPATIENT
Start: 2025-01-21

## 2025-01-21 RX ORDER — METFORMIN HYDROCHLORIDE 500 MG/1
500 TABLET, EXTENDED RELEASE ORAL
Qty: 90 TABLET | Refills: 1 | Status: SHIPPED | OUTPATIENT
Start: 2025-01-21

## 2025-01-21 RX ORDER — ATORVASTATIN CALCIUM 10 MG/1
10 TABLET, FILM COATED ORAL DAILY
Qty: 90 TABLET | Refills: 1 | Status: SHIPPED | OUTPATIENT
Start: 2025-01-21

## 2025-01-21 RX ORDER — LISINOPRIL 40 MG/1
40 TABLET ORAL DAILY
Qty: 90 TABLET | Refills: 1 | Status: SHIPPED | OUTPATIENT
Start: 2025-01-21

## 2025-01-21 NOTE — ASSESSMENT & PLAN NOTE
Orders:    lisinopril (PRINIVIL,ZESTRIL) 40 MG tablet; Take 1 tablet by mouth Daily.    hydroCHLOROthiazide 25 MG tablet; Take 1 tablet by mouth Daily.

## 2025-01-21 NOTE — PROGRESS NOTES
Subjective   The ABCs of the Annual Wellness Visit  Medicare Wellness Visit      Meek Quiros is a 64 y.o. patient who presents for a Medicare Wellness Visit.    Colonoscopy 9/20/2022.  Endoscopy done in 2024 and normal.  Since last visit here patient has been diagnosed with sleep apnea and is using CPAP with improved sleep.  Some fatigue since having COVID in 2020 persist but slightly better.  Currently denies chest pain.  Last eye exam in August and dental cleaning in December.  Reports blood pressures under good control.  Defers abdominal aortic aneurysm screening.  Handouts provided on Tdap and Prevnar 20 vaccines.  May receive vaccination here at his local pharmacy.  Chest CT scan negative last year.    Has been seeing orthopedics for shoulder and knee pain.  Has had some numbness and tingling of the left hand.  Orthopedics has recently ordered an MRI of the cervical spine.    Acid reflux symptoms are stable on omeprazole 40 mg daily.  Denies side effects and requests refills.    Blood pressure under good control on lisinopril 40 mg daily and hydrochlorothiazide 25 mg daily.  Denies side effects and requests refills.    For diabetes he takes metformin extended release 500 mg once daily.  Last hemoglobin A1c was 6.3% in July.    History of iron deficiency anemia still on an iron supplement.  Due for labs to be rechecked.    For hyperlipidemia takes atorvastatin 10 mg at bedtime.  Denies side effects and requests refills.      The following portions of the patient's history were reviewed and   updated as appropriate: allergies, current medications, past family history, past medical history, past social history, past surgical history, and problem list.    Compared to one year ago, the patient's physical   health is better.  Compared to one year ago, the patient's mental   health is the same.    Recent Hospitalizations:  He was not admitted to the hospital during the last year.     Current Medical  Providers:  Patient Care Team:  Gayle Terry APRN as PCP - General (Nurse Practitioner)  Gustabo Walton OD (Optometry)  Jasper April, MARY as Nurse Practitioner (Nurse Practitioner)  Bob Rushing MD (Orthopedic Surgery)  Laney Wise MD as Emergency Attending (Sleep Medicine)  Shahid Guerra MD as Consulting Physician (General Surgery)    Outpatient Medications Prior to Visit   Medication Sig Dispense Refill    ferrous sulfate 325 (65 Fe) MG tablet Take 1 tablet by mouth Every Other Day.      multivitamin with minerals tablet tablet Take 1 tablet by mouth Daily.      atorvastatin (LIPITOR) 10 MG tablet Take 1 tablet by mouth Daily. 90 tablet 1    hydroCHLOROthiazide 25 MG tablet Take 1 tablet by mouth Daily. 90 tablet 1    lisinopril (PRINIVIL,ZESTRIL) 40 MG tablet Take 1 tablet by mouth Daily. 90 tablet 1    metFORMIN ER (GLUCOPHAGE-XR) 500 MG 24 hr tablet Take 1 tablet by mouth Daily With Dinner. 90 tablet 1    omeprazole (priLOSEC) 40 MG capsule Take 1 capsule by mouth Daily. 30 capsule 0     No facility-administered medications prior to visit.     No opioid medication identified on active medication list. I have reviewed chart for other potential  high risk medication/s and harmful drug interactions in the elderly.      Aspirin is not on active medication list.  Aspirin use is not indicated based on review of current medical condition/s. Risk of harm outweighs potential benefits.  .    Patient Active Problem List   Diagnosis    HTN (hypertension)    Hyperlipidemia    Type 2 diabetes mellitus without complication, without long-term current use of insulin    Other fatigue    History of anemia    Anemia    Acute bilateral low back pain with bilateral sciatica    Gastroesophageal reflux disease    Medicare annual wellness visit, subsequent    Sleep apnea     Advance Care Planning Advance Directive is on file.  ACP discussion was held with the patient during this visit. Patient has an advance  "directive in EMR which is still valid.             Objective   Vitals:    25 0913 25 0957   BP: 142/57 130/68   BP Location: Left arm Right arm   Patient Position: Sitting Sitting   Cuff Size: Large Adult Adult   Pulse: 51    Temp: 97.7 °F (36.5 °C)    TempSrc: Temporal    SpO2: 98%    Weight: 104 kg (229 lb)    Height: 188 cm (74\")    PainSc:   1    PainLoc: Generalized        Estimated body mass index is 29.4 kg/m² as calculated from the following:    Height as of this encounter: 188 cm (74\").    Weight as of this encounter: 104 kg (229 lb).                Does the patient have evidence of cognitive impairment? no                                                                                                Health  Risk Assessment    Smoking Status:  Social History     Tobacco Use   Smoking Status Former    Current packs/day: 0.00    Average packs/day: 1 pack/day for 18.0 years (18.0 ttl pk-yrs)    Types: Cigarettes    Start date: 1968    Quit date: 1986    Years since quittin.0    Passive exposure: Past   Smokeless Tobacco Never   Tobacco Comments    Started at age 7     Alcohol Consumption:  Social History     Substance and Sexual Activity   Alcohol Use Yes    Alcohol/week: 1.0 standard drink of alcohol    Types: 1 Glasses of wine per week    Comment: OCC       Fall Risk Screen  STEADI Fall Risk Assessment was completed, and patient is at LOW risk for falls.Assessment completed on:2025    Depression Screening   Little interest or pleasure in doing things? Not at all   Feeling down, depressed, or hopeless? Not at all   PHQ-2 Total Score 0      Health Habits and Functional and Cognitive Screenin/21/2025     9:21 AM   Functional & Cognitive Status   Do you have difficulty preparing food and eating? No   Do you have difficulty bathing yourself, getting dressed or grooming yourself? No   Do you have difficulty using the toilet? No   Do you have difficulty moving around from " place to place? No   Do you have trouble with steps or getting out of a bed or a chair? No   Current Diet Limited Junk Food   Dental Exam Up to date   Eye Exam Up to date        Eye Exam Comment [facility]   Exercise (times per week) 0 times per week   Current Exercises Include No Regular Exercise   Do you need help using the phone?  No   Are you deaf or do you have serious difficulty hearing?  No   Do you need help to go to places out of walking distance? No   Do you need help shopping? No   Do you need help preparing meals?  No   Do you need help with housework?  No   Do you need help with laundry? No   Do you need help taking your medications? No   Do you need help managing money? No   Do you ever drive or ride in a car without wearing a seat belt? No   Have you felt unusual stress, anger or loneliness in the last month? No   Who do you live with? Spouse   If you need help, do you have trouble finding someone available to you? No   Have you been bothered in the last four weeks by sexual problems? No   Do you have difficulty concentrating, remembering or making decisions? No           Age-appropriate Screening Schedule:  Refer to the list below for future screening recommendations based on patient's age, sex and/or medical conditions. Orders for these recommended tests are listed in the plan section. The patient has been provided with a written plan.    Health Maintenance List  Health Maintenance   Topic Date Due    HEMOGLOBIN A1C  01/16/2025    AAA SCREEN ONCE  01/21/2025 (Originally 5/19/2023)    TDAP/TD VACCINES (1 - Tdap) 01/21/2025 (Originally 5/19/1977)    Pneumococcal Vaccine 65+ (1 of 2 - PCV) 02/05/2025 (Originally 5/19/1964)    INFLUENZA VACCINE  03/31/2025 (Originally 7/1/2024)    COVID-19 Vaccine (3 - 2024-25 season) 01/21/2026 (Originally 9/1/2024)    LIPID PANEL  07/16/2025    BMI FOLLOWUP  07/16/2025    DIABETIC EYE EXAM  08/09/2025    ANNUAL WELLNESS VISIT  01/21/2026    DIABETIC FOOT EXAM   "01/21/2026    COLORECTAL CANCER SCREENING  02/28/2032    HEPATITIS C SCREENING  Completed    ZOSTER VACCINE  Completed    URINE MICROALBUMIN  Discontinued                                                                                                                                                CMS Preventative Services Quick Reference  Risk Factors Identified During Encounter  Immunizations Discussed/Encouraged: Tdap and Prevnar 20 (Pneumococcal 20-valent conjugate)  Dental Screening Recommended  Vision Screening Recommended    The above risks/problems have been discussed with the patient.  Pertinent information has been shared with the patient in the After Visit Summary.  An After Visit Summary and PPPS were made available to the patient.    Follow Up:   Next Medicare Wellness visit to be scheduled in 1 year.         Additional E&M Note during same encounter follows:  Patient has additional, significant, and separately identifiable condition(s)/problem(s) that require work above and beyond the Medicare Wellness Visit     Chief Complaint  Medicare Wellness-subsequent, Diabetes (6 month follow up ), Hyperlipidemia, and Hypertension    Subjective   HPI  Venu is also being seen today for an annual adult preventative physical exam.                 Objective   Vital Signs:  /68 (BP Location: Right arm, Patient Position: Sitting, Cuff Size: Adult)   Pulse 51   Temp 97.7 °F (36.5 °C) (Temporal)   Ht 188 cm (74\")   Wt 104 kg (229 lb)   SpO2 98%   BMI 29.40 kg/m²   Physical Exam  Vitals reviewed.   Constitutional:       General: He is not in acute distress.     Appearance: Normal appearance. He is well-developed.   HENT:      Right Ear: Tympanic membrane normal.      Left Ear: Tympanic membrane normal.   Neck:      Thyroid: No thyromegaly.      Vascular: No carotid bruit.   Cardiovascular:      Rate and Rhythm: Normal rate and regular rhythm.      Pulses:           Dorsalis pedis pulses are 2+ on the right " side and 2+ on the left side.        Posterior tibial pulses are 2+ on the right side and 2+ on the left side.      Heart sounds: Normal heart sounds.   Pulmonary:      Effort: Pulmonary effort is normal.      Breath sounds: Normal breath sounds.   Abdominal:      General: Abdomen is flat. Bowel sounds are normal. There is no distension.      Palpations: Abdomen is soft. There is no mass.      Tenderness: There is no abdominal tenderness. There is no guarding or rebound.   Musculoskeletal:      Right lower leg: No edema.      Left lower leg: No edema.   Feet:      Right foot:      Protective Sensation: 3 sites tested.  3 sites sensed.      Skin integrity: Skin integrity normal. No ulcer or blister.      Toenail Condition: Right toenails are normal.      Left foot:      Protective Sensation: 3 sites tested.  3 sites sensed.      Skin integrity: Skin integrity normal. No ulcer or blister.      Toenail Condition: Left toenails are normal.      Comments:      Skin:     General: Skin is warm and dry.   Neurological:      General: No focal deficit present.      Mental Status: He is alert.   Psychiatric:         Attention and Perception: Attention normal.         Mood and Affect: Mood and affect normal.         Behavior: Behavior normal.         The following data was reviewed by: MARY Mora on 01/21/2025:  Data reviewed : Consultant notes ortho, sleep medicine, surgery  Common labs          7/16/2024    10:12   Common Labs   Glucose 124    BUN 33    Creatinine 1.21    Sodium 138    Potassium 5.1    Chloride 104    Calcium 9.6    Albumin 4.2    Total Bilirubin 0.3    Alkaline Phosphatase 63    AST (SGOT) 15    ALT (SGPT) 18    Total Cholesterol 148    Triglycerides 149    HDL Cholesterol 34    LDL Cholesterol  88    Hemoglobin A1C 6.30              Assessment and Plan Additional age appropriate preventative wellness advice topics were discussed during today's preventative wellness exam(some topics already  addressed during AWV portion of the note above):   Nutrition: Discussed nutrition plan with patient. Information shared in after visit summary. Goal is for a well balanced diet to enhance overall health.           Medicare annual wellness visit, subsequent         Type 2 diabetes mellitus without complication, without long-term current use of insulin  Diabetes is stable.   Continue current treatment regimen.  Diabetes will be reassessed in 6 months    Orders:    Comprehensive metabolic panel; Future    Hemoglobin A1c; Future    Lipid panel; Future    Microalbumin / Creatinine Urine Ratio - Urine, Clean Catch; Future    metFORMIN ER (GLUCOPHAGE-XR) 500 MG 24 hr tablet; Take 1 tablet by mouth Daily With Dinner.    Anemia, unspecified type    Orders:    CBC w AUTO Differential; Future    Iron and TIBC; Future    Primary hypertension      Orders:    lisinopril (PRINIVIL,ZESTRIL) 40 MG tablet; Take 1 tablet by mouth Daily.    hydroCHLOROthiazide 25 MG tablet; Take 1 tablet by mouth Daily.    Mixed hyperlipidemia       Orders:    atorvastatin (LIPITOR) 10 MG tablet; Take 1 tablet by mouth Daily.    Gastroesophageal reflux disease, unspecified whether esophagitis present    Orders:    omeprazole (priLOSEC) 40 MG capsule; Take 1 capsule by mouth Daily.    Sleep apnea, unspecified type               I spent 50 minutes caring for Meek on this date of service. This time includes time spent by me in the following activities:preparing for the visit, reviewing tests, obtaining and/or reviewing a separately obtained history, performing a medically appropriate examination and/or evaluation , counseling and educating the patient/family/caregiver, ordering medications, tests, or procedures, and documenting information in the medical record  Follow Up   Return in about 6 months (around 7/21/2025).  Patient was given instructions and counseling regarding his condition or for health maintenance advice. Please see specific information  pulled into the AVS if appropriate.

## 2025-01-21 NOTE — ASSESSMENT & PLAN NOTE
Diabetes is stable.   Continue current treatment regimen.  Diabetes will be reassessed in 6 months    Orders:    Comprehensive metabolic panel; Future    Hemoglobin A1c; Future    Lipid panel; Future    Microalbumin / Creatinine Urine Ratio - Urine, Clean Catch; Future    metFORMIN ER (GLUCOPHAGE-XR) 500 MG 24 hr tablet; Take 1 tablet by mouth Daily With Dinner.

## 2025-01-21 NOTE — ASSESSMENT & PLAN NOTE
Orders:    omeprazole (priLOSEC) 40 MG capsule; Take 1 capsule by mouth Daily.     Bottle Feeding Log/Shaken Baby Prevention Handout/Birth Certificate Instructions/Breastfeeding Log/Breastfeeding Guide and Packet/[facility] Hearing Screen Program/[facility]  Screening Program/Breastfeeding Mother’s Support Group Information/Guide to Postpartum Care

## 2025-01-22 RX ORDER — ATORVASTATIN CALCIUM 10 MG/1
10 TABLET, FILM COATED ORAL DAILY
Qty: 90 TABLET | Refills: 3 | OUTPATIENT
Start: 2025-01-22

## 2025-01-22 RX ORDER — HYDROCHLOROTHIAZIDE 25 MG/1
25 TABLET ORAL DAILY
Qty: 90 TABLET | Refills: 3 | OUTPATIENT
Start: 2025-01-22

## 2025-01-22 RX ORDER — METFORMIN HYDROCHLORIDE 500 MG/1
500 TABLET, EXTENDED RELEASE ORAL
Qty: 90 TABLET | Refills: 3 | OUTPATIENT
Start: 2025-01-22

## 2025-01-22 RX ORDER — LISINOPRIL 40 MG/1
40 TABLET ORAL DAILY
Qty: 90 TABLET | Refills: 3 | OUTPATIENT
Start: 2025-01-22

## 2025-01-22 NOTE — PROGRESS NOTES
Diabetes is under good control.  Kidney and liver function are normal.  White blood cell count is normal and anemia has resolved currently.

## 2025-01-24 ENCOUNTER — HOSPITAL ENCOUNTER (OUTPATIENT)
Dept: OTHER | Facility: HOSPITAL | Age: 65
Discharge: HOME | End: 2025-01-24

## 2025-01-30 ENCOUNTER — OFFICE VISIT (OUTPATIENT)
Dept: SLEEP MEDICINE | Facility: HOSPITAL | Age: 65
End: 2025-01-30
Payer: MEDICARE

## 2025-01-30 VITALS — WEIGHT: 225 LBS | HEART RATE: 52 BPM | OXYGEN SATURATION: 99 % | BODY MASS INDEX: 28.88 KG/M2 | HEIGHT: 74 IN

## 2025-01-30 DIAGNOSIS — E66.3 OVERWEIGHT WITH BODY MASS INDEX (BMI) 25.0-29.9: ICD-10-CM

## 2025-01-30 DIAGNOSIS — G47.33 OBSTRUCTIVE SLEEP APNEA: Primary | ICD-10-CM

## 2025-01-30 PROCEDURE — G0463 HOSPITAL OUTPT CLINIC VISIT: HCPCS

## 2025-01-30 NOTE — PROGRESS NOTES
"Follow Up [facility] Note     Chief Complaint:  DAVE     Primary Care Physician: Gayle Terry APRN    Interval History:   The patient is a 64 y.o. male  who was last seen in the sleep lab: 2024 for HST which showed AHI 10.9 lowest SpO2 86%.  Advised auto CPAP 8-16 cm H2O.  Presents today for first follow-up since starting PAP machine.  Reports improvement since last visit.    Downloaded PAP Data Reviewed For Compliance:  DME is [email or web address].  Downloads between 2024 - 2025.  Average usage is 7 hours 6 minutes.  Average AHI is 0.7.  Average auto CPAP pressure is 11.8 cm H2O    I have reviewed the above results and compared them with the patient's last downloads and reviewed with the patient.    Review of Systems:    A complete review of systems was done and all were negative with the exception of reflux    Social History:    Social History     Socioeconomic History    Marital status:    Tobacco Use    Smoking status: Former     Current packs/day: 0.00     Average packs/day: 1 pack/day for 18.0 years (18.0 ttl pk-yrs)     Types: Cigarettes     Start date: 1968     Quit date: 1986     Years since quittin.1     Passive exposure: Past    Smokeless tobacco: Never    Tobacco comments:     Started at age 7   Vaping Use    Vaping status: Never Used   Substance and Sexual Activity    Alcohol use: Yes     Alcohol/week: 1.0 standard drink of alcohol     Types: 1 Glasses of wine per week     Comment: OCC    Drug use: Never    Sexual activity: Defer       Allergies:  Patient has no known allergies.     Medication Review:  Reviewed.      Vital Signs:    Vitals:    25 1000   Pulse: 52   SpO2: 99%   Weight: 102 kg (225 lb)   Height: 188 cm (74.02\")     Body mass index is 28.88 kg/m².    Physical Exam:    Constitutional:  Well developed 64 y.o. male that appears in no apparent distress.  Awake & oriented times 3.  Normal mood with normal recent and remote memory and normal " judgement.  Eyes:  Conjunctivae normal.  Oropharynx: Previously, moist mucous membranes.    Self-administered Ruth[place] Sleepiness Scale test results: 4  0-5 Lower normal daytime sleepiness  6-10 Higher normal daytime sleepiness  11-12 Mild, 13-15 Moderate, & 16-24 Severe excessive daytime sleepiness    Impression:   1. Obstructive sleep apnea    2. Overweight with body mass index (BMI) 25.0-29.9        Obstructive sleep apnea adequately treated with auto CPAP 8-16 cm H2O. The patient appears to be at goal with good compliance and usage. The patient has improved complaints of hypersomnolence.    Plan:  Good sleep hygiene measures should be maintained.  Weight loss would be beneficial in this patient who is overweight by Body mass index is 28.88 kg/m²..      After evaluating the patient and assessing results available, the patient is benefiting from the treatment being provided.     The patient will continue CPAP.  After clinical evaluation and review of downloads, I recommend no changes to the patient's pressures.  A new prescription will be sent to the patient's DME.    Caution during activities that require prolonged concentration is strongly advised if sleepiness returns. Changing of PAP supplies regularly is important for effective use. Patient needs to change cushion on the mask or plugs on nasal pillows along with disposable filters once every month and change mask frame, tubing, headgear and Velcro straps every 6 months at the minimum.    I answered all of the patient's questions.  The patient will call for any problems and will follow up in 6 months.      Laney Wise MD  Sleep Medicine  01/30/25  10:18 EST

## 2025-04-15 ENCOUNTER — TELEPHONE (OUTPATIENT)
Dept: SLEEP MEDICINE | Facility: HOSPITAL | Age: 65
End: 2025-04-15
Payer: COMMERCIAL

## 2025-04-15 NOTE — TELEPHONE ENCOUNTER
Patient called stating he was having issues with his mask. After he described his issues I recommend he get a new seal from his DME

## 2025-07-03 ENCOUNTER — TELEMEDICINE (OUTPATIENT)
Dept: SLEEP MEDICINE | Facility: HOSPITAL | Age: 65
End: 2025-07-03
Payer: COMMERCIAL

## 2025-07-03 DIAGNOSIS — G47.33 OBSTRUCTIVE SLEEP APNEA: Primary | ICD-10-CM

## 2025-07-03 PROCEDURE — 99213 OFFICE O/P EST LOW 20 MIN: CPT | Performed by: FAMILY MEDICINE

## 2025-07-03 NOTE — PROGRESS NOTES
Follow Up Sleep Disorders Center Note     Chief Complaint:  DAVE    The provider is located in [place] using a secure MyChart Video Visit through [employer]. Patient is being seen remotely via telehealth at their home address in [place], and stated that they are in a secure environment for this session. The patient's condition being diagnosed/treated is appropriate for telemedicine. The provider has identified herself as well as her credentials. The patient and/or patient's guardian, consent to be seen remotely, and when consent is given they understand that the consent allows for patient identifiable information to be sent to a third party as needed. They may refuse to be seen remotely at any time. The electronic data is encrypted and password protected, and the patient and/or guardian has been advised of the potential risks to privacy not withstanding such measures.   PT identifiers used: Name and .     You have chosen to receive care through a telehealth visit. Do you consent to use a video/audio connection for your medical care today? Yes.    Primary Care Physician: Gayle Terry APRN    Interval History:   The patient is a 65 y.o. male  who was last seen in the sleep lab: 2025; presents today for 6-month follow-up apnea; 2024 study showed AHI 10.9 lowest SpO2 86%.  On auto CPAP.  No problems with mask machine hypersomnia nonrestorative sleep.    Downloaded PAP Data Reviewed For Compliance:  DME is [employer] [facility].  Downloads between 2025 - 2025.  Average usage is 6 hours 18 minutes.  Average AHI is 0.5.  Average auto CPAP pressure is 11.0    I have reviewed the above results and compared them with the patient's last downloads and reviewed with the patient.    Review of Systems:    A complete review of systems was done and all were negative with the exception of negative    Social History:    Social History     Socioeconomic History    Marital status:    Tobacco Use    Smoking status: Former      Current packs/day: 0.00     Average packs/day: 1 pack/day for 18.0 years (18.0 ttl pk-yrs)     Types: Cigarettes     Start date: 1968     Quit date: 1986     Years since quittin.5     Passive exposure: Past    Smokeless tobacco: Never    Tobacco comments:     Started at age 7   Vaping Use    Vaping status: Never Used   Substance and Sexual Activity    Alcohol use: Yes     Alcohol/week: 1.0 standard drink of alcohol     Types: 1 Glasses of wine per week     Comment: OCC    Drug use: Never    Sexual activity: Defer       Allergies:  Patient has no known allergies.     Medication Review:  Reviewed.      Impression:   1. Obstructive sleep apnea        Obstructive sleep apnea adequately treated with auto CPAP 8-16 cm H2O. The patient appears to be at goal with good compliance and usage. The patient has no complaints of hypersomnolence.    Plan:  Good sleep hygiene measures should be maintained.      After evaluating the patient and assessing results available, the patient is benefiting from the treatment being provided.     The patient will continue CPAP.  After clinical evaluation and review of downloads, I recommend no changes to the patient's pressures.  A new prescription will be sent to the patient's DME.    Caution during activities that require prolonged concentration is strongly advised if sleepiness returns. Changing of PAP supplies regularly is important for effective use. Patient needs to change cushion on the mask or plugs on nasal pillows along with disposable filters once every month and change mask frame, tubing, headgear and Velcro straps every 6 months at the minimum.    I answered all of the patient's questions.  The patient will call for any problems and will follow up in 1 year.      Laney Wise MD  Sleep Medicine  25  09:16 EDT

## 2025-07-22 ENCOUNTER — OFFICE VISIT (OUTPATIENT)
Dept: NEUROSURGERY | Facility: CLINIC | Age: 65
End: 2025-07-22
Payer: MEDICARE

## 2025-07-22 VITALS
BODY MASS INDEX: 28.75 KG/M2 | WEIGHT: 224 LBS | HEART RATE: 57 BPM | DIASTOLIC BLOOD PRESSURE: 54 MMHG | HEIGHT: 74 IN | SYSTOLIC BLOOD PRESSURE: 133 MMHG

## 2025-07-22 DIAGNOSIS — M47.812 CERVICAL SPONDYLOSIS WITHOUT MYELOPATHY: ICD-10-CM

## 2025-07-22 DIAGNOSIS — M54.12 CERVICAL RADICULOPATHY: Primary | ICD-10-CM

## 2025-07-22 NOTE — PROGRESS NOTES
"Chief Complaint  Neck Pain (MRI Cervical Spine WO completed on 2025 & EMG completed on 2025/), Shoulder Pain (Left ), Arm Pain (Left arm to 2-3 digits of left hand ), and Tingling (2-3 digits of left hand /)    Subjective     {Problem List  Visit Diagnosis   Encounters  Notes  Medications  Labs  Result Review Imaging  Media :23}     Meek Quiros who is a 65 y.o. year old male who presents to [facility] for Evaluation of the Spine.     The patient complains of pain located in the {Spine Pain Location:37014} Spine.  Patients states the pain has been present for {Numbers; 0-30:81723} {days/weeks:49659}.  The pain came on {acutely/gradually:11161}.  The pain scaled level is {0-10:36611}.  The pain {Dermatomes :93052}.  The pain is {Constant/Intermittent/Waxing/Wanin} and described as {Pain description:28652}.  The pain is worse {time of day:96898}. Patient states {What makes the the pain better?:66420}.  Patient states {What makes the pain worse?:52699}.    Associated Symptoms Include: {Associated Symptoms (Spine):89925}  Conservative Interventions Include: {Intervention Methods:26518}    {Was this the result of an injury or accident? (Optional):19321}    History of Previous Spinal Surgery?: {Yes/No Spinal Surgery:60662}     reports that he quit smoking about 39 years ago. His smoking use included cigarettes. He started smoking about 57 years ago. He has a 18 pack-year smoking history. He has been exposed to tobacco smoke. He has never used smokeless tobacco.    Review of Systems   Musculoskeletal:  Positive for neck stiffness.      Objective   Vital Signs:   /54 (BP Location: Right arm, Patient Position: Sitting, Cuff Size: Adult)   Pulse 57   Ht 188 cm (74.02\")   Wt 102 kg (224 lb)   BMI 28.74 kg/m²       Physical Exam   Neurological Exam    Result Review  {Labs  Result Review  Imaging  Med Tab  Media  Procedures :23}   {The " following data was reviewed by (Optional):86083}  {Results Reviewed (Brain) (Optional):12767}       Assessment and Plan {CC Problem List  Visit Diagnosis   ROS  Review (Popup)  Health Maintenance  Quality  BestPractice  Medications  SmartSets  SnapShot Encounters  Media :23}   There are no diagnoses linked to this encounter.    {Time Spent (Optional):10359}  Follow Up {Instructions Charge Capture  Follow-up Communications :23}  No follow-ups on file.  Patient was given instructions and counseling regarding his condition or for health maintenance advice. Please see specific information pulled into the AVS if appropriate.

## 2025-07-22 NOTE — PROGRESS NOTES
Chief Complaint  Neck Pain (MRI Cervical Spine WO completed on 1/24/2025 & EMG completed on 6/18/2025/), Shoulder Pain (Left ), Arm Pain (Left arm to 2-3 digits of left hand ), and Tingling (2-3 digits of left hand /)    Subjective          Meek Quiros who is a 65 y.o. year old male who presents to [facility] for evaluation of cervical spine.     History of Present Illness  The patient is a 65-year-old male presenting for left arm pain.    He underwent an MRI of his neck and a nerve study to investigate the cause of his left arm pain. The symptoms began less than a year ago, initially presenting as soreness in the shoulder and elbow. This was followed by tingling in two fingers and itching in the palm. He noticed that certain head and neck movements would alleviate the discomfort. Over time, the frequency of these symptoms seems to have decreased. The symptoms do not wake him up at night. He reports no weakness in the hand or arm, and no loss of function during episodes of tingling. The tingling is intermittent and can be triggered by certain positions, such as sitting idle. He has not undergone physical therapy but did receive an epidural injection from pain management, which did not provide significant relief. He has not taken any medication for the pain or discomfort, including over-the-counter options like Tylenol or ibuprofen. He rates his current pain level as 2 out of 10. He reports no symptoms in the right hand or arm. He has not had any back surgery but does experience back issues.      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      Review of Systems   Musculoskeletal:  Positive for myalgias, neck pain and neck stiffness.   Neurological:  Positive for  "numbness.   All other systems reviewed and are negative.       Objective   Vital Signs:   /54 (BP Location: Right arm, Patient Position: Sitting, Cuff Size: Adult)   Pulse 57   Ht 188 cm (74.02\")   Wt 102 kg (224 lb)   BMI 28.74 kg/m²       Physical Exam  Vitals reviewed.   Constitutional:       Appearance: Normal appearance.   Musculoskeletal:      Right shoulder: No tenderness. Normal range of motion.      Left shoulder: No tenderness. Normal range of motion.      Cervical back: Tenderness present. Pain with movement present. Decreased range of motion.   Neurological:      Mental Status: He is alert.      Motor: Motor strength is normal.     Deep Tendon Reflexes:      Reflex Scores:       Tricep reflexes are 2+ on the right side and 2+ on the left side.       Bicep reflexes are 2+ on the right side and 2+ on the left side.       Brachioradialis reflexes are 2+ on the right side and 2+ on the left side.       Neurological Exam  Mental Status  Alert.    Motor   Strength is 5/5 throughout all four extremities.    Sensory  Sensation is intact to light touch, pinprick, vibration and proprioception in all four extremities.    Reflexes                                            Right                      Left  Brachioradialis                    2+                         2+  Biceps                                 2+                         2+  Triceps                                2+                         2+    Right pathological reflexes: Buster's absent.  Left pathological reflexes: Buster's absent.    Gait  Casual gait is normal including stance, stride, and arm swing.      Physical Exam  Motor Examination    Muscle Bulk and Tone: Tension noted in upper trapezius muscles.    Strength: Hand  strength normal bilaterally. Upper limb strength 5/5 bilaterally.    Upper Extremity Drift Test: No upper extremity drift.    Neck: No pain or tenderness on neck palpation.       Result Review :       Data " reviewed : Radiologic studies MRI Cervical Spine on 1/24/25 at [facility] personally reviewed and interpreted. Degenerative changes, most significant at C5/6 and C6/7. At C5/6 there is mild to moderate spinal canal and moderately severe left, moderate right foraminal stenosis. At C6/7 there is mild spinal stenosis with moderately severe right and moderate left foraminal stenosis. No cord signal change.        EMG/NCV on 6/5/25 demonstrates mild median neuropathy bilaterally. No evidence of a cervical radiculopathy.      Assessment and Plan    Diagnoses and all orders for this visit:    1. Cervical radiculopathy (Primary)    2. Cervical spondylosis without myelopathy        Assessment & Plan  1. Left arm pain.  The left arm pain is likely due to cervical spine issues rather than carpal tunnel syndrome, as indicated by the MRI and nerve study results. The MRI shows degenerative changes and disc bulges at the C5-6 and C6-7 levels, with mild spinal stenosis and bone spurring. The nerve study did not reveal any radicular damage. Chiropractic treatment is not recommended due to the risk of arterial dissection and stroke. Over-the-counter anti-inflammatory medications such as Tylenol can be used as needed for pain management. Advised to avoid strenuous heavy lifting and jarring activities that could exacerbate symptoms. If symptoms persist or worsen, physical therapy may be considered to relieve strain on the joints and nerves.    2. Mild carpal tunnel syndrome.  The nerve study indicated mild carpal tunnel syndrome, but it is not the primary concern at this time. Wrist splints can be used if symptoms develop, but currently, there are no significant symptoms to address.         I spent 30 minutes caring for Meek on this date of service. This time includes time spent by me in the following activities:preparing for the visit, reviewing tests, obtaining and/or reviewing a separately obtained history, performing a  medically appropriate examination and/or evaluation , counseling and educating the patient/family/caregiver, documenting information in the medical record, and independently interpreting results and communicating that information with the patient/family/caregiver.    Follow Up   Return if symptoms worsen or fail to improve.  Patient was given instructions and counseling regarding his condition or for health maintenance advice.     Patient or patient representative verbalized consent for the use of Ambient Listening during the visit with  MARY Philip for chart documentation. 7/24/2025  10:14 EDT

## 2025-07-23 ENCOUNTER — LAB (OUTPATIENT)
Dept: LAB | Facility: HOSPITAL | Age: 65
End: 2025-07-23
Payer: MEDICARE

## 2025-07-23 ENCOUNTER — OFFICE VISIT (OUTPATIENT)
Dept: FAMILY MEDICINE CLINIC | Age: 65
End: 2025-07-23
Payer: MEDICARE

## 2025-07-23 VITALS
WEIGHT: 224 LBS | HEIGHT: 74 IN | BODY MASS INDEX: 28.75 KG/M2 | OXYGEN SATURATION: 98 % | HEART RATE: 50 BPM | SYSTOLIC BLOOD PRESSURE: 134 MMHG | DIASTOLIC BLOOD PRESSURE: 67 MMHG | TEMPERATURE: 97.9 F

## 2025-07-23 DIAGNOSIS — L98.9 SKIN LESION: ICD-10-CM

## 2025-07-23 DIAGNOSIS — E11.9 TYPE 2 DIABETES MELLITUS WITHOUT COMPLICATION, WITHOUT LONG-TERM CURRENT USE OF INSULIN: ICD-10-CM

## 2025-07-23 DIAGNOSIS — Z86.2 HISTORY OF ANEMIA: ICD-10-CM

## 2025-07-23 DIAGNOSIS — E78.2 MIXED HYPERLIPIDEMIA: ICD-10-CM

## 2025-07-23 DIAGNOSIS — I10 PRIMARY HYPERTENSION: ICD-10-CM

## 2025-07-23 DIAGNOSIS — K21.9 GASTROESOPHAGEAL REFLUX DISEASE, UNSPECIFIED WHETHER ESOPHAGITIS PRESENT: ICD-10-CM

## 2025-07-23 DIAGNOSIS — R53.83 OTHER FATIGUE: ICD-10-CM

## 2025-07-23 DIAGNOSIS — R53.83 OTHER FATIGUE: Primary | ICD-10-CM

## 2025-07-23 PROBLEM — G89.29 CHRONIC PAIN: Status: ACTIVE | Noted: 2025-02-13

## 2025-07-23 PROBLEM — M54.12 CERVICAL RADICULOPATHY: Status: ACTIVE | Noted: 2025-02-13

## 2025-07-23 LAB
ALBUMIN SERPL-MCNC: 4.2 G/DL (ref 3.5–5.2)
ALBUMIN/GLOB SERPL: 1.4 G/DL
ALP SERPL-CCNC: 75 U/L (ref 39–117)
ALT SERPL W P-5'-P-CCNC: 20 U/L (ref 1–41)
ANION GAP SERPL CALCULATED.3IONS-SCNC: 9 MMOL/L (ref 5–15)
AST SERPL-CCNC: 20 U/L (ref 1–40)
BASOPHILS # BLD AUTO: 0.04 10*3/MM3 (ref 0–0.2)
BASOPHILS NFR BLD AUTO: 0.6 % (ref 0–1.5)
BILIRUB SERPL-MCNC: 0.4 MG/DL (ref 0–1.2)
BUN SERPL-MCNC: 32 MG/DL (ref 8–23)
BUN/CREAT SERPL: 23.5 (ref 7–25)
CALCIUM SPEC-SCNC: 9.9 MG/DL (ref 8.6–10.5)
CHLORIDE SERPL-SCNC: 103 MMOL/L (ref 98–107)
CHOLEST SERPL-MCNC: 136 MG/DL (ref 0–200)
CO2 SERPL-SCNC: 25 MMOL/L (ref 22–29)
CREAT SERPL-MCNC: 1.36 MG/DL (ref 0.76–1.27)
DEPRECATED RDW RBC AUTO: 43.4 FL (ref 37–54)
EGFRCR SERPLBLD CKD-EPI 2021: 57 ML/MIN/1.73
EOSINOPHIL # BLD AUTO: 0.13 10*3/MM3 (ref 0–0.4)
EOSINOPHIL NFR BLD AUTO: 2 % (ref 0.3–6.2)
ERYTHROCYTE [DISTWIDTH] IN BLOOD BY AUTOMATED COUNT: 12.5 % (ref 12.3–15.4)
FERRITIN SERPL-MCNC: 309 NG/ML (ref 30–400)
GLOBULIN UR ELPH-MCNC: 2.9 GM/DL
GLUCOSE SERPL-MCNC: 136 MG/DL (ref 65–99)
HBA1C MFR BLD: 6.6 % (ref 4.8–5.6)
HCT VFR BLD AUTO: 43.5 % (ref 37.5–51)
HDLC SERPL-MCNC: 34 MG/DL (ref 40–60)
HGB BLD-MCNC: 14.5 G/DL (ref 13–17.7)
IMM GRANULOCYTES # BLD AUTO: 0.04 10*3/MM3 (ref 0–0.05)
IMM GRANULOCYTES NFR BLD AUTO: 0.6 % (ref 0–0.5)
IRON 24H UR-MRATE: 92 MCG/DL (ref 59–158)
IRON SATN MFR SERPL: 24 % (ref 20–50)
LDLC SERPL CALC-MCNC: 77 MG/DL (ref 0–100)
LDLC/HDLC SERPL: 2.18 {RATIO}
LYMPHOCYTES # BLD AUTO: 2.04 10*3/MM3 (ref 0.7–3.1)
LYMPHOCYTES NFR BLD AUTO: 31.8 % (ref 19.6–45.3)
MCH RBC QN AUTO: 31.5 PG (ref 26.6–33)
MCHC RBC AUTO-ENTMCNC: 33.3 G/DL (ref 31.5–35.7)
MCV RBC AUTO: 94.6 FL (ref 79–97)
MONOCYTES # BLD AUTO: 0.58 10*3/MM3 (ref 0.1–0.9)
MONOCYTES NFR BLD AUTO: 9 % (ref 5–12)
NEUTROPHILS NFR BLD AUTO: 3.58 10*3/MM3 (ref 1.7–7)
NEUTROPHILS NFR BLD AUTO: 56 % (ref 42.7–76)
PLATELET # BLD AUTO: 307 10*3/MM3 (ref 140–450)
PMV BLD AUTO: 9.5 FL (ref 6–12)
POTASSIUM SERPL-SCNC: 4.9 MMOL/L (ref 3.5–5.2)
PROT SERPL-MCNC: 7.1 G/DL (ref 6–8.5)
RBC # BLD AUTO: 4.6 10*6/MM3 (ref 4.14–5.8)
SODIUM SERPL-SCNC: 137 MMOL/L (ref 136–145)
TIBC SERPL-MCNC: 383 MCG/DL (ref 298–536)
TRANSFERRIN SERPL-MCNC: 257 MG/DL (ref 200–360)
TRIGL SERPL-MCNC: 140 MG/DL (ref 0–150)
TSH SERPL DL<=0.05 MIU/L-ACNC: 1.11 UIU/ML (ref 0.27–4.2)
VLDLC SERPL-MCNC: 25 MG/DL (ref 5–40)
WBC NRBC COR # BLD AUTO: 6.41 10*3/MM3 (ref 3.4–10.8)

## 2025-07-23 PROCEDURE — 84403 ASSAY OF TOTAL TESTOSTERONE: CPT

## 2025-07-23 PROCEDURE — 83540 ASSAY OF IRON: CPT

## 2025-07-23 PROCEDURE — 80053 COMPREHEN METABOLIC PANEL: CPT

## 2025-07-23 PROCEDURE — 80061 LIPID PANEL: CPT

## 2025-07-23 PROCEDURE — 82728 ASSAY OF FERRITIN: CPT

## 2025-07-23 PROCEDURE — 84402 ASSAY OF FREE TESTOSTERONE: CPT

## 2025-07-23 PROCEDURE — 36415 COLL VENOUS BLD VENIPUNCTURE: CPT

## 2025-07-23 PROCEDURE — 85025 COMPLETE CBC W/AUTO DIFF WBC: CPT

## 2025-07-23 PROCEDURE — 84443 ASSAY THYROID STIM HORMONE: CPT

## 2025-07-23 PROCEDURE — 83036 HEMOGLOBIN GLYCOSYLATED A1C: CPT

## 2025-07-23 PROCEDURE — 84466 ASSAY OF TRANSFERRIN: CPT

## 2025-07-23 RX ORDER — ATORVASTATIN CALCIUM 10 MG/1
10 TABLET, FILM COATED ORAL DAILY
Qty: 90 TABLET | Refills: 1 | Status: SHIPPED | OUTPATIENT
Start: 2025-07-23

## 2025-07-23 RX ORDER — LISINOPRIL 40 MG/1
40 TABLET ORAL DAILY
Qty: 90 TABLET | Refills: 1 | Status: SHIPPED | OUTPATIENT
Start: 2025-07-23

## 2025-07-23 RX ORDER — HYDROCHLOROTHIAZIDE 25 MG/1
25 TABLET ORAL DAILY
Qty: 90 TABLET | Refills: 1 | Status: SHIPPED | OUTPATIENT
Start: 2025-07-23

## 2025-07-23 RX ORDER — OMEPRAZOLE 40 MG/1
40 CAPSULE, DELAYED RELEASE ORAL DAILY
Qty: 90 CAPSULE | Refills: 1 | Status: SHIPPED | OUTPATIENT
Start: 2025-07-23

## 2025-07-23 RX ORDER — METFORMIN HYDROCHLORIDE 500 MG/1
500 TABLET, EXTENDED RELEASE ORAL
Qty: 90 TABLET | Refills: 1 | Status: SHIPPED | OUTPATIENT
Start: 2025-07-23

## 2025-07-23 RX ORDER — MUPIROCIN 2 %
1 OINTMENT (GRAM) TOPICAL 3 TIMES DAILY
Qty: 21 G | Refills: 0 | Status: SHIPPED | OUTPATIENT
Start: 2025-07-23 | End: 2025-07-30

## 2025-07-23 NOTE — ASSESSMENT & PLAN NOTE
Monitor blood pressure blood sugar at home and report any elevated readings.  Further treatment recommendations pending testing results.  If skin lesion on the foot is not improving he is to notify this office and I will then prescribe an oral antibiotic.  Follow-up if not improving.

## 2025-07-23 NOTE — PROGRESS NOTES
"Chief Complaint  Diabetes (6 month follow up ), Hypertension, Hyperlipidemia, and Sleep Apnea    Subjective          Meek Quiros presents to [facility]     Patient is a 65-year-old male who has had fatigue since having COVID about 5 years ago.  He was diagnosed with sleep apnea last year and is using CPAP with a mild improvement.  He does still have 1 maybe 2 days a week where he wakes up and knows he is going to be fatigued the rest of the day.  He denies any chest pain, irregular heartbeat, shortness of breath, and denies any problems either falling or staying asleep.  Fatigue workup initially within normal limits.  He does take a daily multivitamin every day so is getting B12 and vitamin D.  I will recheck thyroid function and testosterone level.  He is fasting and it is ideal to get a testosterone level between 8 and 10 AM.    Regarding type 2 diabetes his last hemoglobin A1c in January was 6.4.  Current treatment is metformin extended release 500 mg once daily.  Denies side effects and requests refills.  Gets his eye exams done every August.    Acid reflux is stable on omeprazole 40 mg/day.  Denies side effects and requests refills.    Blood pressure under good control on lisinopril 40 mg/day and hydrochlorothiazide 25 mg/day.  Denies side effects and requests refills.    Has a mass on his upper back and is inquiring whether he should follow-up with [facility].    He visited his daughter recently and suspects he may have received a possible insect bite on the side of his right heel.  He would like to have this area examined today.     Objective   Vital Signs:   Vitals:    07/23/25 0837   BP: 134/67   BP Location: Left arm   Patient Position: Sitting   Cuff Size: Adult   Pulse: 50   Temp: 97.9 °F (36.6 °C)   TempSrc: Oral   SpO2: 98%   Weight: 102 kg (224 lb)   Height: 188 cm (74\")       Wt Readings from Last 3 Encounters:   07/23/25 102 kg (224 " lb)   07/22/25 102 kg (224 lb)   01/30/25 102 kg (225 lb)      BP Readings from Last 3 Encounters:   07/23/25 134/67   07/22/25 133/54   01/21/25 130/68       Body mass index is 28.76 kg/m².           Physical Exam  Vitals reviewed.   Constitutional:       General: He is not in acute distress.     Appearance: Normal appearance. He is well-developed.   Neck:      Thyroid: No thyromegaly.      Vascular: No carotid bruit.   Cardiovascular:      Rate and Rhythm: Normal rate and regular rhythm.      Pulses:           Posterior tibial pulses are 2+ on the right side and 2+ on the left side.      Heart sounds: Normal heart sounds.   Pulmonary:      Effort: Pulmonary effort is normal.      Breath sounds: Normal breath sounds.       Musculoskeletal:      Right lower leg: No edema.      Left lower leg: No edema.        Feet:    Skin:     General: Skin is warm and dry.   Neurological:      General: No focal deficit present.      Mental Status: He is alert.   Psychiatric:         Attention and Perception: Attention normal.         Mood and Affect: Mood and affect normal.         Behavior: Behavior normal.           Current Outpatient Medications:     atorvastatin (LIPITOR) 10 MG tablet, Take 1 tablet by mouth Daily., Disp: 90 tablet, Rfl: 1    ferrous sulfate 325 (65 Fe) MG tablet, Take 1 tablet by mouth Every Other Day., Disp: , Rfl:     hydroCHLOROthiazide 25 MG tablet, Take 1 tablet by mouth Daily., Disp: 90 tablet, Rfl: 1    lisinopril (PRINIVIL,ZESTRIL) 40 MG tablet, Take 1 tablet by mouth Daily., Disp: 90 tablet, Rfl: 1    metFORMIN ER (GLUCOPHAGE-XR) 500 MG 24 hr tablet, Take 1 tablet by mouth Daily With Dinner., Disp: 90 tablet, Rfl: 1    multivitamin with minerals tablet tablet, Take 1 tablet by mouth Daily., Disp: , Rfl:     omeprazole (priLOSEC) 40 MG capsule, Take 1 capsule by mouth Daily., Disp: 90 capsule, Rfl: 1    mupirocin (BACTROBAN) 2 % ointment, Apply 1 Application topically to the appropriate area as  directed 3 (Three) Times a Day for 7 days., Disp: 21 g, Rfl: 0   Past Medical History:   Diagnosis Date    Acid reflux     Anemia 2023    Arthritis 2000    In knees    Cancer Melanoma    Colon polyp long time ago    Diabetes mellitus 2020    Hyperlipidemia     Hypertension Long time ago    Lumbosacral disc disease 1985    Bulging disc L4-5    Sleep apnea 2025     No Known Allergies            Result Review :     Common labs          2025    10:10   Common Labs   Glucose 124    BUN 17    Creatinine 1.09    Sodium 143    Potassium 4.7    Chloride 105    Calcium 9.5    Albumin 4.1    Total Bilirubin 0.4    Alkaline Phosphatase 69    AST (SGOT) 20    ALT (SGPT) 23    WBC 6.47    Hemoglobin 14.5    Hematocrit 45.3    Platelets 288    Total Cholesterol 153    Triglycerides 144    HDL Cholesterol 37    LDL Cholesterol  91    Hemoglobin A1C 6.40    Microalbumin, Urine <1.2         No Images in the past 120 days found..           Social History     Tobacco Use   Smoking Status Former    Current packs/day: 0.00    Average packs/day: 1 pack/day for 18.0 years (18.0 ttl pk-yrs)    Types: Cigarettes    Start date: 1968    Quit date: 1986    Years since quittin.5    Passive exposure: Past   Smokeless Tobacco Never   Tobacco Comments    Started at age 7           Assessment and Plan    Diagnoses and all orders for this visit:    1. Other fatigue (Primary)  -     Testosterone, Free, Total; Future  -     TSH Rfx On Abnormal To Free T4; Future    2. Primary hypertension  -     lisinopril (PRINIVIL,ZESTRIL) 40 MG tablet; Take 1 tablet by mouth Daily.  Dispense: 90 tablet; Refill: 1  -     hydroCHLOROthiazide 25 MG tablet; Take 1 tablet by mouth Daily.  Dispense: 90 tablet; Refill: 1    3. Gastroesophageal reflux disease, unspecified whether esophagitis present  -     omeprazole (priLOSEC) 40 MG capsule; Take 1 capsule by mouth Daily.  Dispense: 90 capsule; Refill: 1    4. Type 2 diabetes mellitus  without complication, without long-term current use of insulin  -     metFORMIN ER (GLUCOPHAGE-XR) 500 MG 24 hr tablet; Take 1 tablet by mouth Daily With Dinner.  Dispense: 90 tablet; Refill: 1  -     Comprehensive metabolic panel; Future  -     Hemoglobin A1c; Future  -     Lipid panel; Future    5. Mixed hyperlipidemia  -     atorvastatin (LIPITOR) 10 MG tablet; Take 1 tablet by mouth Daily.  Dispense: 90 tablet; Refill: 1  -     Lipid panel; Future    6. History of anemia  -     CBC w AUTO Differential; Future  -     Iron and TIBC; Future  -     Ferritin; Future    7. Skin lesion  -     mupirocin (BACTROBAN) 2 % ointment; Apply 1 Application topically to the appropriate area as directed 3 (Three) Times a Day for 7 days.  Dispense: 21 g; Refill: 0        Follow Up    No follow-ups on file.  Patient was given instructions and counseling regarding his condition or for health maintenance advice. Please see specific information pulled into the AVS if appropriate.

## 2025-07-27 LAB
TESTOST FREE SERPL-MCNC: 8.6 PG/ML (ref 6.6–18.1)
TESTOST SERPL-MCNC: 444 NG/DL (ref 264–916)

## 2025-07-28 DIAGNOSIS — R79.89 ELEVATED SERUM CREATININE: Primary | ICD-10-CM

## (undated) DEVICE — ANGIO-SEAL VIP VASCULAR CLOSURE DEVICE: Brand: ANGIO-SEAL

## (undated) DEVICE — SI AVANTI+ 6F STD W/GW  NO OBT: Brand: AVANTI

## (undated) DEVICE — CONN JET HYDRA H20 AUXILIARY DISP

## (undated) DEVICE — CATH F5 INF JL 4 100CM: Brand: INFINITI

## (undated) DEVICE — SINGLE-USE BIOPSY FORCEPS: Brand: RADIAL JAW 4

## (undated) DEVICE — Device: Brand: DEFENDO AIR/WATER/SUCTION AND BIOPSY VALVE

## (undated) DEVICE — RADIFOCUS OPTITORQUE ANGIOGRAPHIC CATHETER: Brand: OPTITORQUE

## (undated) DEVICE — SOLIDIFIER LIQLOC PLS 1500CC BT

## (undated) DEVICE — GLIDESHEATH SLENDER STAINLESS STEEL KIT: Brand: GLIDESHEATH SLENDER

## (undated) DEVICE — SOL IRRG H2O PL/BG 1000ML STRL

## (undated) DEVICE — SOL IRR NACL 0.9PCT BT 1000ML

## (undated) DEVICE — CATH F5 INF JL 3.5 100CM: Brand: INFINITI

## (undated) DEVICE — GOWN,REINFRCE,POLY,SIRUS,BREATH SLV,XXLG: Brand: MEDLINE

## (undated) DEVICE — GW FC FLOP/TP .035 260CM 3MM

## (undated) DEVICE — LINER SURG CANSTR SXN S/RIGD 1500CC

## (undated) DEVICE — Device

## (undated) DEVICE — CATH F5 INF AL I 100CM: Brand: INFINITI

## (undated) DEVICE — COLON KIT: Brand: MEDLINE INDUSTRIES, INC.

## (undated) DEVICE — CATH F5 INF JR 4 100CM: Brand: INFINITI

## (undated) DEVICE — BLCK/BITE BLOX WO/DENTL/RIM W/STRAP 54F